# Patient Record
Sex: MALE | Race: WHITE | NOT HISPANIC OR LATINO | Employment: OTHER | URBAN - METROPOLITAN AREA
[De-identification: names, ages, dates, MRNs, and addresses within clinical notes are randomized per-mention and may not be internally consistent; named-entity substitution may affect disease eponyms.]

---

## 2024-07-03 ENCOUNTER — MEDICAL CORRESPONDENCE (OUTPATIENT)
Dept: HEALTH INFORMATION MANAGEMENT | Facility: CLINIC | Age: 69
End: 2024-07-03

## 2024-07-03 ENCOUNTER — TRANSFERRED RECORDS (OUTPATIENT)
Dept: HEALTH INFORMATION MANAGEMENT | Facility: CLINIC | Age: 69
End: 2024-07-03
Payer: COMMERCIAL

## 2024-07-17 ENCOUNTER — TRANSCRIBE ORDERS (OUTPATIENT)
Dept: OTHER | Age: 69
End: 2024-07-17

## 2024-07-17 ENCOUNTER — TELEPHONE (OUTPATIENT)
Dept: OPHTHALMOLOGY | Facility: CLINIC | Age: 69
End: 2024-07-17
Payer: COMMERCIAL

## 2024-07-17 DIAGNOSIS — C44.1122 BASAL CELL CARCINOMA OF RIGHT LOWER EYELID: Primary | ICD-10-CM

## 2024-07-17 NOTE — TELEPHONE ENCOUNTER
[facility] Call Center    Phone Message    May a detailed message be left on voicemail: yes     Reason for Call: Appointment Intake    Referring Provider Name: Leanna Joy MD  Diagnosis and/or Symptoms:Right eye, Basal Cell carcinoma right lower lid and lacrimal sac   Referral is in pts chart, writer unable to schedule per guidelines please contact pt to discuss options Thank you     Action Taken: Message routed to:  [facility] eye    Travel Screening: Not Applicable     Date of Service:

## 2024-07-18 ENCOUNTER — TELEPHONE (OUTPATIENT)
Dept: OPHTHALMOLOGY | Facility: CLINIC | Age: 69
End: 2024-07-18
Payer: COMMERCIAL

## 2024-07-18 NOTE — TELEPHONE ENCOUNTER
"Patient confirmed scheduled appointment:  Date: 7/23/24  Time: 7:45am  Visit type: NEW PLASTICS  Provider:    Location: [facility] Location   Testing/imaging: MOHS EVAL  Additional notes: \"RLL medial canthus BCC, Mohs eval, notes and path in Epic\"-Per TOD PERAZA-Appt Per PT         Scheduled patient as offered. Sent reminder letter and map to confirmed address.-Per Patient    "

## 2024-07-18 NOTE — TELEPHONE ENCOUNTER
Please review for how soon for scheduling.    High suspicion of extensive basal cell carcinoma Rt lower lid and lacrimal sac area.  biopsy taken today, recommend Mohs      REQUESTING PLASTICS     Referring Provider Name: Leanna Joy MD  Diagnosis and/or Symptoms:Right eye, Basal Cell carcinoma right lower lid and lacrimal sac   Referral is in pts chart

## 2024-07-18 NOTE — TELEPHONE ENCOUNTER
High suspicion of extensive basal cell carcinoma Rt lower lid and lacrimal sac area.  biopsy taken today, recommend Mohs     REQUESTING PLASTICS     Thanks,     V

## 2024-07-18 NOTE — TELEPHONE ENCOUNTER
"Ok to schedule w/ Ali preferably [facility] on 7/23, if that doesn't work could do [facility] 7/24 @ 2:30. \"Preceptal cellulitis, records in Care Everywhere\"    Thanks,  Krystina Sharp RN RN 12:52 PM 07/18/24    "

## 2024-07-18 NOTE — TELEPHONE ENCOUNTER
"Gayathri-  Ok to offer whatever appointment time the other patient doesn't take per our discussion.  \"RLL [facility], Mohs octavia, notes and path in Epic\"    Thank you,  Krystina Sharp RN RN 1:02 PM 07/18/24    "

## 2024-07-19 ENCOUNTER — TELEPHONE (OUTPATIENT)
Dept: OPHTHALMOLOGY | Facility: CLINIC | Age: 69
End: 2024-07-19
Payer: COMMERCIAL

## 2024-07-19 NOTE — TELEPHONE ENCOUNTER
FUTURE VISIT INFORMATION      FUTURE VISIT INFORMATION:  Date: 7/23/24  Time: 7:45am  Location[facility]  REFERRAL INFORMATION:  Referring provider:  TOD PERAZA   Referring providers clinic:  [facility]  Reason for visit/diagnosis  RLL medial canthus BCC, Mohs eval    RECORDS REQUESTED FROM:       Clinic name Comments Records Status Imaging Status   [facility] Recs scanned into chart  Path report 7/3/24 EPIC

## 2024-07-23 ENCOUNTER — PRE VISIT (OUTPATIENT)
Dept: OPHTHALMOLOGY | Facility: CLINIC | Age: 69
End: 2024-07-23

## 2024-07-23 ENCOUNTER — OFFICE VISIT (OUTPATIENT)
Dept: OPHTHALMOLOGY | Facility: CLINIC | Age: 69
End: 2024-07-23
Attending: OPHTHALMOLOGY
Payer: COMMERCIAL

## 2024-07-23 DIAGNOSIS — C44.1122 BASAL CELL CARCINOMA OF RIGHT LOWER EYELID: ICD-10-CM

## 2024-07-23 PROCEDURE — 99204 OFFICE O/P NEW MOD 45 MIN: CPT | Mod: GC | Performed by: OPHTHALMOLOGY

## 2024-07-23 PROCEDURE — 92285 EXTERNAL OCULAR PHOTOGRAPHY: CPT | Mod: GC | Performed by: OPHTHALMOLOGY

## 2024-07-23 RX ORDER — ROSUVASTATIN CALCIUM 40 MG/1
40 TABLET, COATED ORAL AT BEDTIME
COMMUNITY

## 2024-07-23 RX ORDER — FERROUS SULFATE 324(65)MG
TABLET, DELAYED RELEASE (ENTERIC COATED) ORAL
COMMUNITY
Start: 2018-02-01

## 2024-07-23 RX ORDER — EZETIMIBE 10 MG/1
1 TABLET ORAL DAILY
COMMUNITY
Start: 2022-02-01

## 2024-07-23 RX ORDER — ASPIRIN 81 MG/1
TABLET, CHEWABLE ORAL
COMMUNITY
Start: 2018-02-01

## 2024-07-23 RX ORDER — ERYTHROMYCIN 5 MG/G
OINTMENT OPHTHALMIC
COMMUNITY
Start: 2024-07-05

## 2024-07-23 RX ORDER — TADALAFIL 20 MG/1
1 TABLET ORAL DAILY
COMMUNITY

## 2024-07-23 RX ORDER — TAMSULOSIN HYDROCHLORIDE 0.4 MG/1
1 CAPSULE ORAL 2 TIMES DAILY
COMMUNITY
Start: 2023-06-01

## 2024-07-23 ASSESSMENT — VISUAL ACUITY
OD_CC: 20/30
OD_PH_CC+: 18
OD_CC+: -1
OD_PH_CC: 19
OS_CC: 20/25
METHOD: SNELLEN - LINEAR
CORRECTION_TYPE: GLASSES

## 2024-07-23 ASSESSMENT — EXTERNAL EXAM - LEFT EYE: OS_EXAM: NORMAL

## 2024-07-23 ASSESSMENT — CONF VISUAL FIELD
OD_NORMAL: 1
OS_SUPERIOR_TEMPORAL_RESTRICTION: 0
OD_SUPERIOR_NASAL_RESTRICTION: 0
OS_NORMAL: 1
OD_INFERIOR_TEMPORAL_RESTRICTION: 0
OS_SUPERIOR_NASAL_RESTRICTION: 0
OS_INFERIOR_NASAL_RESTRICTION: 0
OD_INFERIOR_NASAL_RESTRICTION: 0
METHOD: COUNTING FINGERS
OS_INFERIOR_TEMPORAL_RESTRICTION: 0
OD_SUPERIOR_TEMPORAL_RESTRICTION: 0

## 2024-07-23 ASSESSMENT — SLIT LAMP EXAM - LIDS: COMMENTS: NORMAL

## 2024-07-23 ASSESSMENT — TONOMETRY
OD_IOP_MMHG: 19
OS_IOP_MMHG: 18
IOP_METHOD: ICARE

## 2024-07-23 ASSESSMENT — EXTERNAL EXAM - RIGHT EYE: OD_EXAM: NORMAL

## 2024-07-23 NOTE — LETTER
2024         RE:  :  MRN: Manuel Hurd  1955  [id number]     Dear Dr. Leanna Joy,    Thank you for asking me to see your patient, Manuel Hurd, for an oculoplastic   consultation.  My assessment and plan are below.  For further details, please see my attached clinic note.      CC: Eyelid lesion    Chief Complaint(s) and History of Present Illness(es)     Consult For            Associated symptoms: tearing and discharge    Treatments tried: ointment    Pain scale: 0/10    Comments: Manuel Hurd is being seen for a Mohs Evaluation today by the   request of Dr. Joy due to L medical canthus BCC .            Comments    Patient report that for the past 2 years that there was an area of concern   near the nasal corner of right eye. No issues with discomfort related to   area. Biopsy done on 7/3/24 that identified the BCC.  Watering with right eye> left eye the past 1year. Mostly tearing but some   mucus each eye. Some blurring with each eye but feels likely related to   tearing each eye.     Luann Candelaria, COT COT 7:42 AM 2024               HPI:     Manuel Hurd is a 68 year old male who has noted a lesion on the right lower eyelid. It has been present for 2 years. This has been biopsied (7/3/24).    Enlarging: Yes  Irritating to eyelashes and catches in folds of skin: Yes; also has epiphora right eye > left eye  Bleeding: Yes  Prior cutaneous malignancy: No  Immunosuppression: No    PMH: HTN, HLD, CAD (s/p stents, on ASA 81 mg daily)         Assessment and Plan:   1. Right lower eyelid biopsy proven BCC    CT orbits w/ and w/o contrast to assess lesion extent then likely Mohs referral with oculoplastics reconstruction   Discussed with patient that lesion may involve lacrimal sac and his epiphora may worsen post-operatively. Discussed that there are interventions that can be taken once he heals if epiphora persists and is bothersome.  Recommend also establishing care with  dermatology for full body skin evaluation (has never seen a dermatologist).      Again, thank you for allowing me to participate in the care of your patient.      Sincerely,    Lanre Linn MD  Department of Ophthalmology and Visual Neurosciences  [facility]    CC: Mini Cruz PA-C  [address] [place] [id number]  Via Fax: [phone]     TOD PERAZA  [address] [place] [address] [address] [place] [address]  Via Fax: [phone]

## 2024-07-23 NOTE — PROGRESS NOTES
Oculoplastic Clinic New Patient    Patient: Manuel Hurd MRN# [id number]   YOB: 1955 Age: 68 year old   Date of Visit: Jul 23, 2024         CC: Eyelid lesion    Chief Complaint(s) and History of Present Illness(es)     Consult For            Associated symptoms: tearing and discharge    Treatments tried: ointment    Pain scale: 0/10    Comments: Manuel Hurd is being seen for a Mohs Evaluation today by the   request of Dr. Joy due to RLL medical canthus BCC .            Comments    Patient report that for the past 2 years that there was an area of concern   near the nasal corner of right eye. No issues with discomfort related to   area. Biopsy done on 7/3/24 that identified the BCC.  Watering with right eye> left eye the past 1year. Mostly tearing but some   mucus each eye. Some blurring with each eye but feels likely related to   tearing each eye.     ADRIANA De Jesus COT 7:42 AM July 23, 2024               HPI:     Manuel Hurd is a 68 year old male who has noted a lesion on the right lower eyelid. It has been present for 2 years. This has been biopsied (7/3/24).    Enlarging: Yes  Irritating to eyelashes and catches in folds of skin: Yes; also has epiphora right eye > left eye  Bleeding: Yes  Prior cutaneous malignancy: No  Immunosuppression: No    PMH: HTN, HLD, CAD (s/p stents, on ASA 81 mg daily)         Assessment and Plan:   1. Right lower eyelid biopsy proven BCC    CT orbits w/ and w/o contrast to assess lesion extent then likely Mohs referral with oculoplastics reconstruction   Discussed with patient that lesion may involve lacrimal sac and his epiphora may worsen post-operatively. Discussed that there are interventions that can be taken once he heals if epiphora persists and is bothersome.  Recommend also establishing care with dermatology for full body skin evaluation (has never seen a dermatologist).       Faye Mcgraw MD  Oculoplastic Surgery Fellow     Agree with above.  Firmly fixed to bone along medial canthus. CT orbit. Mohs consult. Will schedule with enough time for possible further excision, possible resection of some bone with drill or sonopet. Discussed he will likely lose much of his lacrimal system and extent of recon will depend on what we have left. Likely glabella flap or small paramedian forehead flap.    Attending Physician Attestation: Complete documentation of historical and exam elements from today's encounter can be found in the full encounter summary report (not reduplicated in this progress note). I personally obtained the chief complaint(s) and history of present illness. I confirmed and edited as necessary the review of systems, past medical/surgical history, family history, social history, and examination findings as documented by others; and I examined the patient myself. I personally reviewed the relevant tests, images, and reports as documented above. I formulated and edited as necessary the assessment and plan and discussed the findings and management plan with the patient. Lanre Linn MD    Today with Manuel Hurd, I reviewed the indications, risks, benefits, and alternatives of the proposed surgical procedure including, but not limited to, failure obtain the desired result  and need for additional surgery, bleeding, infection, loss of vision, injury to the eye, and the remote possibility of permanent damage to any organ system or death with the use of anesthesia.  I provided multiple opportunities for the questions, answered all questions to the best of my ability, and confirmed that my answers and my discussion were understood. Lanre Linn MD

## 2024-07-23 NOTE — NURSING NOTE
Chief Complaints and History of Present Illnesses   Patient presents with    Consult For     Manuel Hurd is being seen for a Mohs Evaluation today by the request of Dr. Joy due to [facility] BCC .       Chief Complaint(s) and History of Present Illness(es)       Consult For              Associated symptoms: tearing and discharge    Treatments tried: ointment    Pain scale: 0/10    Comments: Manuel Hurd is being seen for a Mohs Evaluation today by the request of Dr. Joy due to [facility] BCC .                Comments    Patient report that for the past 2 years that there was an area of concern near the nasal corner of right eye. No issues with discomfort related to area. Biopsy done on 7/3/24 that identified the BCC.  Watering with right eye> left eye the past 1year. Mostly tearing but some mucus each eye. Some blurring with each eye but feels likely related to tearing each eye.     Luann Candelaria, COT COT 7:42 AM July 23, 2024

## 2024-07-28 ENCOUNTER — HEALTH MAINTENANCE LETTER (OUTPATIENT)
Age: 69
End: 2024-07-28

## 2024-08-01 ENCOUNTER — ANCILLARY PROCEDURE (OUTPATIENT)
Dept: CT IMAGING | Facility: CLINIC | Age: 69
End: 2024-08-01
Attending: OPHTHALMOLOGY
Payer: COMMERCIAL

## 2024-08-01 DIAGNOSIS — C44.1122 BASAL CELL CARCINOMA OF RIGHT LOWER EYELID: ICD-10-CM

## 2024-08-01 PROCEDURE — 70481 CT ORBIT/EAR/FOSSA W/DYE: CPT | Mod: GC | Performed by: STUDENT IN AN ORGANIZED HEALTH CARE EDUCATION/TRAINING PROGRAM

## 2024-08-01 RX ORDER — IOPAMIDOL 755 MG/ML
75 INJECTION, SOLUTION INTRAVASCULAR ONCE
Status: COMPLETED | OUTPATIENT
Start: 2024-08-01 | End: 2024-08-01

## 2024-08-01 RX ADMIN — IOPAMIDOL 75 ML: 755 INJECTION, SOLUTION INTRAVASCULAR at 11:40

## 2024-08-05 ENCOUNTER — MYC MEDICAL ADVICE (OUTPATIENT)
Dept: OPHTHALMOLOGY | Facility: CLINIC | Age: 69
End: 2024-08-05
Payer: COMMERCIAL

## 2024-08-09 PROBLEM — C44.1122 BASAL CELL CARCINOMA OF RIGHT LOWER EYELID: Status: ACTIVE | Noted: 2024-07-23

## 2024-08-09 NOTE — TELEPHONE ENCOUNTER
Date Scheduled: 9-26-24  Facility: Surgery Locations: [facility] [place]  Surgeon: Dr. Linn   Post-op appointment scheduled:    scheduled?: No  Surgery packet/instructions confirmed received?  Yes-mailed  Pre op physical/PAC appointment: [facility] in [place]  Special Considerations:     Karen Gutierrez  Surgery Scheduling Coordinator  Ph: [phone]

## 2024-08-09 NOTE — TELEPHONE ENCOUNTER
Message left for the patient to call back to get surgery scheduled with Dr. Linn and Dr. Narayanan.     [facility]  Surgery Scheduling Coordinator  Ph: [phone]

## 2024-09-20 NOTE — TELEPHONE ENCOUNTER
FUTURE VISIT INFORMATION      FUTURE VISIT INFORMATION:  Date: 9.26.24  Time: 8:30  Location: [facility] [place] [place]  REFERRAL INFORMATION:  Referring provider:  Kaveh  Referring providers clinic:  [facility]  Reason for visit/diagnosis  BCC R lower eyelid. closing with oculoplastics.      RECORDS REQUESTED FROM:       Clinic name Comments Records Status Imaging Status   [facility] 7.23.24  [facility] Derm 7.3.24  [facility]  Path # [id number] Received

## 2024-09-25 ENCOUNTER — ANESTHESIA EVENT (OUTPATIENT)
Dept: SURGERY | Facility: AMBULATORY SURGERY CENTER | Age: 69
End: 2024-09-25
Payer: COMMERCIAL

## 2024-09-26 ENCOUNTER — PRE VISIT (OUTPATIENT)
Dept: DERMATOLOGY | Facility: CLINIC | Age: 69
End: 2024-09-26

## 2024-09-26 ENCOUNTER — ANESTHESIA (OUTPATIENT)
Dept: SURGERY | Facility: AMBULATORY SURGERY CENTER | Age: 69
End: 2024-09-26
Payer: COMMERCIAL

## 2024-09-26 ENCOUNTER — HOSPITAL ENCOUNTER (OUTPATIENT)
Facility: AMBULATORY SURGERY CENTER | Age: 69
Discharge: HOME | End: 2024-09-26
Attending: OPHTHALMOLOGY
Payer: COMMERCIAL

## 2024-09-26 ENCOUNTER — OFFICE VISIT (OUTPATIENT)
Dept: DERMATOLOGY | Facility: CLINIC | Age: 69
End: 2024-09-26
Payer: COMMERCIAL

## 2024-09-26 VITALS
RESPIRATION RATE: 16 BRPM | HEIGHT: 69 IN | WEIGHT: 200 LBS | HEART RATE: 68 BPM | OXYGEN SATURATION: 94 % | DIASTOLIC BLOOD PRESSURE: 75 MMHG | BODY MASS INDEX: 29.62 KG/M2 | SYSTOLIC BLOOD PRESSURE: 123 MMHG | TEMPERATURE: 97.6 F

## 2024-09-26 VITALS — HEART RATE: 67 BPM | DIASTOLIC BLOOD PRESSURE: 79 MMHG | SYSTOLIC BLOOD PRESSURE: 134 MMHG

## 2024-09-26 DIAGNOSIS — C44.111 BASAL CELL CARCINOMA (BCC) OF MEDIAL CANTHUS OF RIGHT EYE: ICD-10-CM

## 2024-09-26 DIAGNOSIS — C44.111 BASAL CELL CARCINOMA (BCC) OF MEDIAL CANTHUS OF RIGHT EYE: Primary | ICD-10-CM

## 2024-09-26 DIAGNOSIS — C44.1122 BASAL CELL CARCINOMA OF RIGHT LOWER EYELID: Primary | ICD-10-CM

## 2024-09-26 PROCEDURE — 67450 EXPLORE/BIOPSY EYE SOCKET: CPT | Mod: RT | Performed by: OPHTHALMOLOGY

## 2024-09-26 PROCEDURE — 21280 MEDIAL CANTHOPEXY: CPT | Mod: 59 | Performed by: OPHTHALMOLOGY

## 2024-09-26 PROCEDURE — 17312 MOHS ADDL STAGE: CPT | Performed by: DERMATOLOGY

## 2024-09-26 PROCEDURE — 14301 TIS TRNFR ANY 30.1-60 SQ CM: CPT | Mod: GC | Performed by: OPHTHALMOLOGY

## 2024-09-26 PROCEDURE — 68520 REMOVAL OF TEAR SAC: CPT | Mod: RT | Performed by: OPHTHALMOLOGY

## 2024-09-26 PROCEDURE — 88305 TISSUE EXAM BY PATHOLOGIST: CPT | Mod: 26 | Performed by: OPHTHALMOLOGY

## 2024-09-26 PROCEDURE — 67450 EXPLORE/BIOPSY EYE SOCKET: CPT | Performed by: NURSE ANESTHETIST, CERTIFIED REGISTERED

## 2024-09-26 PROCEDURE — 17311 MOHS 1 STAGE H/N/HF/G: CPT | Performed by: DERMATOLOGY

## 2024-09-26 PROCEDURE — 88305 TISSUE EXAM BY PATHOLOGIST: CPT | Mod: TC | Performed by: OPHTHALMOLOGY

## 2024-09-26 PROCEDURE — 67450 EXPLORE/BIOPSY EYE SOCKET: CPT | Performed by: ANESTHESIOLOGY

## 2024-09-26 PROCEDURE — 14302 TIS TRNFR ADDL 30 SQ CM: CPT | Mod: GC | Performed by: OPHTHALMOLOGY

## 2024-09-26 RX ORDER — DEXAMETHASONE SODIUM PHOSPHATE 10 MG/ML
4 INJECTION, SOLUTION INTRAMUSCULAR; INTRAVENOUS
Status: DISCONTINUED | OUTPATIENT
Start: 2024-09-26 | End: 2024-09-27 | Stop reason: HOSPADM

## 2024-09-26 RX ORDER — DEXAMETHASONE SODIUM PHOSPHATE 10 MG/ML
4 INJECTION, SOLUTION INTRAMUSCULAR; INTRAVENOUS
Status: DISCONTINUED | OUTPATIENT
Start: 2024-09-26 | End: 2024-09-26 | Stop reason: HOSPADM

## 2024-09-26 RX ORDER — GINSENG 100 MG
CAPSULE ORAL PRN
Status: DISCONTINUED | OUTPATIENT
Start: 2024-09-26 | End: 2024-09-26 | Stop reason: HOSPADM

## 2024-09-26 RX ORDER — PROPOFOL 10 MG/ML
INJECTION, EMULSION INTRAVENOUS PRN
Status: DISCONTINUED | OUTPATIENT
Start: 2024-09-26 | End: 2024-09-26

## 2024-09-26 RX ORDER — FENTANYL CITRATE 50 UG/ML
25 INJECTION, SOLUTION INTRAMUSCULAR; INTRAVENOUS EVERY 5 MIN PRN
Status: DISCONTINUED | OUTPATIENT
Start: 2024-09-26 | End: 2024-09-26 | Stop reason: HOSPADM

## 2024-09-26 RX ORDER — ONDANSETRON 4 MG/1
4 TABLET, ORALLY DISINTEGRATING ORAL EVERY 30 MIN PRN
Status: DISCONTINUED | OUTPATIENT
Start: 2024-09-26 | End: 2024-09-26 | Stop reason: HOSPADM

## 2024-09-26 RX ORDER — NALOXONE HYDROCHLORIDE 0.4 MG/ML
0.1 INJECTION, SOLUTION INTRAMUSCULAR; INTRAVENOUS; SUBCUTANEOUS
Status: DISCONTINUED | OUTPATIENT
Start: 2024-09-26 | End: 2024-09-26 | Stop reason: HOSPADM

## 2024-09-26 RX ORDER — FENTANYL CITRATE 50 UG/ML
50 INJECTION, SOLUTION INTRAMUSCULAR; INTRAVENOUS EVERY 5 MIN PRN
Status: DISCONTINUED | OUTPATIENT
Start: 2024-09-26 | End: 2024-09-26 | Stop reason: HOSPADM

## 2024-09-26 RX ORDER — OXYCODONE HYDROCHLORIDE 5 MG/1
5 TABLET ORAL EVERY 6 HOURS PRN
Qty: 25 TABLET | Refills: 0 | Status: SHIPPED | OUTPATIENT
Start: 2024-09-26 | End: 2024-10-02

## 2024-09-26 RX ORDER — ONDANSETRON 2 MG/ML
4 INJECTION INTRAMUSCULAR; INTRAVENOUS EVERY 30 MIN PRN
Status: DISCONTINUED | OUTPATIENT
Start: 2024-09-26 | End: 2024-09-26 | Stop reason: HOSPADM

## 2024-09-26 RX ORDER — COCAINE HYDROCHLORIDE 40 MG/ML
SOLUTION NASAL PRN
Status: DISCONTINUED | OUTPATIENT
Start: 2024-09-26 | End: 2024-09-26 | Stop reason: HOSPADM

## 2024-09-26 RX ORDER — ONDANSETRON 2 MG/ML
4 INJECTION INTRAMUSCULAR; INTRAVENOUS EVERY 30 MIN PRN
Status: DISCONTINUED | OUTPATIENT
Start: 2024-09-26 | End: 2024-09-27 | Stop reason: HOSPADM

## 2024-09-26 RX ORDER — BACITRACIN ZINC 500 [USP'U]/G
OINTMENT TOPICAL
Qty: 113 G | Refills: 2 | Status: SHIPPED | OUTPATIENT
Start: 2024-09-26

## 2024-09-26 RX ORDER — SODIUM CHLORIDE, SODIUM LACTATE, POTASSIUM CHLORIDE, CALCIUM CHLORIDE 600; 310; 30; 20 MG/100ML; MG/100ML; MG/100ML; MG/100ML
INJECTION, SOLUTION INTRAVENOUS CONTINUOUS
Status: DISCONTINUED | OUTPATIENT
Start: 2024-09-26 | End: 2024-09-26 | Stop reason: HOSPADM

## 2024-09-26 RX ORDER — ACETAMINOPHEN 325 MG/1
975 TABLET ORAL ONCE
Status: COMPLETED | OUTPATIENT
Start: 2024-09-26 | End: 2024-09-26

## 2024-09-26 RX ORDER — ONDANSETRON 4 MG/1
4 TABLET, ORALLY DISINTEGRATING ORAL EVERY 30 MIN PRN
Status: DISCONTINUED | OUTPATIENT
Start: 2024-09-26 | End: 2024-09-27 | Stop reason: HOSPADM

## 2024-09-26 RX ORDER — CEFAZOLIN SODIUM 1 G/3ML
INJECTION, POWDER, FOR SOLUTION INTRAMUSCULAR; INTRAVENOUS PRN
Status: DISCONTINUED | OUTPATIENT
Start: 2024-09-26 | End: 2024-09-26

## 2024-09-26 RX ORDER — OXYMETAZOLINE HYDROCHLORIDE 0.05 G/100ML
SPRAY NASAL PRN
Status: DISCONTINUED | OUTPATIENT
Start: 2024-09-26 | End: 2024-09-26 | Stop reason: HOSPADM

## 2024-09-26 RX ORDER — FENTANYL CITRATE 50 UG/ML
50 INJECTION, SOLUTION INTRAMUSCULAR; INTRAVENOUS ONCE
Status: COMPLETED | OUTPATIENT
Start: 2024-09-26 | End: 2024-09-26

## 2024-09-26 RX ORDER — ONDANSETRON 2 MG/ML
INJECTION INTRAMUSCULAR; INTRAVENOUS PRN
Status: DISCONTINUED | OUTPATIENT
Start: 2024-09-26 | End: 2024-09-26

## 2024-09-26 RX ORDER — LIDOCAINE 40 MG/G
CREAM TOPICAL
Status: DISCONTINUED | OUTPATIENT
Start: 2024-09-26 | End: 2024-09-26 | Stop reason: HOSPADM

## 2024-09-26 RX ORDER — HYDROMORPHONE HYDROCHLORIDE 1 MG/ML
0.4 INJECTION, SOLUTION INTRAMUSCULAR; INTRAVENOUS; SUBCUTANEOUS EVERY 5 MIN PRN
Status: DISCONTINUED | OUTPATIENT
Start: 2024-09-26 | End: 2024-09-26 | Stop reason: HOSPADM

## 2024-09-26 RX ORDER — ERYTHROMYCIN 5 MG/G
OINTMENT OPHTHALMIC
Qty: 3.5 G | Refills: 1 | Status: SHIPPED | OUTPATIENT
Start: 2024-09-26

## 2024-09-26 RX ORDER — LABETALOL HYDROCHLORIDE 5 MG/ML
10 INJECTION, SOLUTION INTRAVENOUS
Status: DISCONTINUED | OUTPATIENT
Start: 2024-09-26 | End: 2024-09-26 | Stop reason: HOSPADM

## 2024-09-26 RX ORDER — OXYMETAZOLINE HYDROCHLORIDE 0.05 G/100ML
1 SPRAY NASAL 2 TIMES DAILY
Qty: 1 ML | Refills: 0 | Status: SHIPPED | OUTPATIENT
Start: 2024-09-26 | End: 2024-09-29

## 2024-09-26 RX ORDER — HYDROMORPHONE HYDROCHLORIDE 1 MG/ML
0.2 INJECTION, SOLUTION INTRAMUSCULAR; INTRAVENOUS; SUBCUTANEOUS EVERY 5 MIN PRN
Status: DISCONTINUED | OUTPATIENT
Start: 2024-09-26 | End: 2024-09-26 | Stop reason: HOSPADM

## 2024-09-26 RX ORDER — ERYTHROMYCIN 5 MG/G
OINTMENT OPHTHALMIC ONCE
Status: SHIPPED | OUTPATIENT
Start: 2024-09-26 | End: 2024-09-26

## 2024-09-26 RX ORDER — ERYTHROMYCIN 5 MG/G
OINTMENT OPHTHALMIC ONCE
Status: COMPLETED | OUTPATIENT
Start: 2024-09-26 | End: 2024-09-26

## 2024-09-26 RX ORDER — OXYCODONE HYDROCHLORIDE 5 MG/1
10 TABLET ORAL
Status: DISCONTINUED | OUTPATIENT
Start: 2024-09-26 | End: 2024-09-27 | Stop reason: HOSPADM

## 2024-09-26 RX ORDER — OXYCODONE HYDROCHLORIDE 5 MG/1
5 TABLET ORAL
Status: COMPLETED | OUTPATIENT
Start: 2024-09-26 | End: 2024-09-26

## 2024-09-26 RX ORDER — DEXAMETHASONE SODIUM PHOSPHATE 4 MG/ML
INJECTION, SOLUTION INTRA-ARTICULAR; INTRALESIONAL; INTRAMUSCULAR; INTRAVENOUS; SOFT TISSUE PRN
Status: DISCONTINUED | OUTPATIENT
Start: 2024-09-26 | End: 2024-09-26

## 2024-09-26 RX ORDER — PROPARACAINE HYDROCHLORIDE 5 MG/ML
1 SOLUTION/ DROPS OPHTHALMIC ONCE
Status: COMPLETED | OUTPATIENT
Start: 2024-09-26 | End: 2024-09-26

## 2024-09-26 RX ORDER — FENTANYL CITRATE 50 UG/ML
INJECTION, SOLUTION INTRAMUSCULAR; INTRAVENOUS PRN
Status: DISCONTINUED | OUTPATIENT
Start: 2024-09-26 | End: 2024-09-26

## 2024-09-26 RX ORDER — NALOXONE HYDROCHLORIDE 0.4 MG/ML
0.1 INJECTION, SOLUTION INTRAMUSCULAR; INTRAVENOUS; SUBCUTANEOUS
Status: DISCONTINUED | OUTPATIENT
Start: 2024-09-26 | End: 2024-09-27 | Stop reason: HOSPADM

## 2024-09-26 RX ORDER — PROPOFOL 10 MG/ML
INJECTION, EMULSION INTRAVENOUS CONTINUOUS PRN
Status: DISCONTINUED | OUTPATIENT
Start: 2024-09-26 | End: 2024-09-26

## 2024-09-26 RX ADMIN — OXYCODONE HYDROCHLORIDE 5 MG: 5 TABLET ORAL at 17:11

## 2024-09-26 RX ADMIN — Medication 100 MCG: at 13:40

## 2024-09-26 RX ADMIN — FENTANYL CITRATE 50 MCG: 50 INJECTION, SOLUTION INTRAMUSCULAR; INTRAVENOUS at 13:18

## 2024-09-26 RX ADMIN — PROPOFOL 150 MCG/KG/MIN: 10 INJECTION, EMULSION INTRAVENOUS at 13:59

## 2024-09-26 RX ADMIN — CEFAZOLIN SODIUM 2 G: 1 INJECTION, POWDER, FOR SOLUTION INTRAMUSCULAR; INTRAVENOUS at 13:10

## 2024-09-26 RX ADMIN — PROPOFOL 200 MG: 10 INJECTION, EMULSION INTRAVENOUS at 13:18

## 2024-09-26 RX ADMIN — PROPOFOL 120 MCG/KG/MIN: 10 INJECTION, EMULSION INTRAVENOUS at 15:38

## 2024-09-26 RX ADMIN — Medication 200 MCG: at 13:35

## 2024-09-26 RX ADMIN — PROPOFOL 50 MG: 10 INJECTION, EMULSION INTRAVENOUS at 14:43

## 2024-09-26 RX ADMIN — FENTANYL CITRATE 25 MCG: 50 INJECTION, SOLUTION INTRAMUSCULAR; INTRAVENOUS at 16:46

## 2024-09-26 RX ADMIN — FENTANYL CITRATE 50 MCG: 50 INJECTION, SOLUTION INTRAMUSCULAR; INTRAVENOUS at 17:03

## 2024-09-26 RX ADMIN — PROPOFOL 150 MCG/KG/MIN: 10 INJECTION, EMULSION INTRAVENOUS at 15:09

## 2024-09-26 RX ADMIN — PROPOFOL 150 MCG/KG/MIN: 10 INJECTION, EMULSION INTRAVENOUS at 13:23

## 2024-09-26 RX ADMIN — PROPARACAINE HYDROCHLORIDE 1 DROP: 5 SOLUTION/ DROPS OPHTHALMIC at 12:34

## 2024-09-26 RX ADMIN — ONDANSETRON 4 MG: 2 INJECTION INTRAMUSCULAR; INTRAVENOUS at 13:10

## 2024-09-26 RX ADMIN — ACETAMINOPHEN 975 MG: 325 TABLET ORAL at 13:03

## 2024-09-26 RX ADMIN — SODIUM CHLORIDE, SODIUM LACTATE, POTASSIUM CHLORIDE, CALCIUM CHLORIDE: 600; 310; 30; 20 INJECTION, SOLUTION INTRAVENOUS at 13:03

## 2024-09-26 RX ADMIN — FENTANYL CITRATE 50 MCG: 50 INJECTION, SOLUTION INTRAMUSCULAR; INTRAVENOUS at 13:22

## 2024-09-26 RX ADMIN — DEXAMETHASONE SODIUM PHOSPHATE 4 MG: 4 INJECTION, SOLUTION INTRA-ARTICULAR; INTRALESIONAL; INTRAMUSCULAR; INTRAVENOUS; SOFT TISSUE at 13:25

## 2024-09-26 RX ADMIN — PROPOFOL 50 MG: 10 INJECTION, EMULSION INTRAVENOUS at 14:50

## 2024-09-26 RX ADMIN — ERYTHROMYCIN: 5 OINTMENT OPHTHALMIC at 12:33

## 2024-09-26 RX ADMIN — Medication 200 MCG: at 13:30

## 2024-09-26 RX ADMIN — Medication 100 MCG: at 13:54

## 2024-09-26 RX ADMIN — PROPOFOL 150 MCG/KG/MIN: 10 INJECTION, EMULSION INTRAVENOUS at 14:40

## 2024-09-26 RX ADMIN — Medication 50 MG: at 13:19

## 2024-09-26 ASSESSMENT — PAIN SCALES - GENERAL: PAINLEVEL: NO PAIN (0)

## 2024-09-26 NOTE — ANESTHESIA PROCEDURE NOTES
Airway       Patient location during procedure: OR       Procedure Start/Stop Times: 9/26/2024 1:23 PM  Staff -        CRNA: Jyoti Lockhart APRN CRNA       Performed By: CRNAIndications and Patient Condition       Indications for airway management: christie-procedural and airway protection       Induction type:intravenous       Mask difficulty assessment: 1 - vent by mask    Final Airway Details       Final airway type: endotracheal airway       Successful airway: ETT - single and Oral  Endotracheal Airway Details        ETT size (mm): 8.0       Cuffed: yes       Successful intubation technique: direct laryngoscopy       DL Blade Type: Leonard 2       Grade View of Cords: 2       Adjucts: stylet       Position: Left       Measured from: lips       Secured at (cm): 23       Bite block used: Oral Airway    Post intubation assessment        Placement verified by: capnometry, equal breath sounds and chest rise        Number of attempts at approach: 1       Number of other approaches attempted: 0       Secured with: tape       Ease of procedure: easy       Dentition: Intact and Unchanged    Medication(s) Administered   Medication Administration Time: 9/26/2024 1:23 PM

## 2024-09-26 NOTE — NURSING NOTE
Chief Complaint   Patient presents with    Procedure     Mohs right lower eyelid     Reyna ARANDA CMA

## 2024-09-26 NOTE — LETTER
9/26/2024       RE: Manuel Hurd  [address]   [address]  [facility] [address]     Dear Colleague,    Thank you for referring your patient, Manuel Hurd, to the [facility] [place] at [facility]. Please see a copy of my visit note below.    [facility] [place] Procedure Note    Date of Service:  Sep 26, 2024  Surgery: Mohs micrographic surgery    Case 1  Repair Type: Oculoplastic surgery - Mokhtarzadeh  Repair Size: n/a cm  Suture Material: n/a  Tumor Type: BCC  Location: R lower eyelid  Derm-Path Accession #: outside path  PreOp Size: 1.4x1.2 cm  PostOp Size: 4x2.5 cm  Mohs Accession #:   Level of Defect: bone (+ at nasal bone)      Procedure:  We discussed the principles of treatment and most likely complications including scarring, bleeding, infection, swelling, pain, crusting, nerve damage, large wound,  incomplete excision, wound dehiscence,  nerve damage, recurrence, and a second procedure may be recommended to obtain the best cosmetic or functional result.    Informed consent was obtained and the patient underwent the procedure as follows:  The patient was placed supine on the operating table.  The cancer was identified, outlined with a marker, and verified by the patient.  The entire surgical field was prepped with iodine.  The surgical site was anesthetized using chlorhexadine.      The area of clinically apparent tumor was debulked. The layer of tissue was then surgically excised using a #15 blade and was then transferred onto a specimen sheet maintaining the orientation of the specimen. Hemostasis was obtained using electrofulguration. The wound site was then covered with a dressing while the tissue samples were processed for examination.    The excised tissue was transported to the Mohs histology laboratory maintaining the tissue orientation.  The tissue specimen was relaxed  so that the entire surgical margin was in a a single horizontal plane for sectioning and inked for precise mapping.  A precise reference map was drawn to reflect the sectioning of the specimen, colored inking of the margins, and orientation on the patient.  The tissue was processed using horizontal sectioning of the base and continuous peripheral margins.  The histopathologic sections were reviewed in conjunction with the reference map.    Total blocks: 1    Total slides:  3    Residual tumor was identified and indicated in red on the reference map, identifying the location where further tissue excision was necessary. Therefore, an additional stage of Mohs Micrographic surgery was deemed necessary.     Stage II   The patient was returned to the operating room, and the area prepped in the usual manner. The residual tumor was excised using the reference map as a guide. The specimen was transfered to a labeled specimen sheet maintaining the orientation of the specimen. Hemostasis was obtained and the wound site was covered with a dressing while the tissue was processed for examination.     The excised tissue was transported to the Mohs histology laboratory maintaining orientation. The specimen margins were inked for precise mapping and a reference map was prepared for the is additional stage to maintain precise orientation as described above. The tissue was processed using horizontal sectioning of the base and continuous peripheral margins. The histopathologic sections were reviewed in conjunction with the reference map.     Total blocks: 2    Total slides:  6    Residual tumor was identified and indicated in red on the reference map, identifying the location where further tissue excision was necessary. Therefore, an additional stage of Mohs Micrographic surgery was deemed necessary.     Stage III   The patient was returned to the operating room, and the area prepped in the usual manner. The residual tumor was excised  using the reference map as a guide. The specimen was transfered to a labeled specimen sheet maintaining the orientation of the specimen. Hemostasis was obtained and the wound site was covered with a dressing while the tissue was processed for examination.     The excised tissue was transported to the Mohs histology laboratory maintaining orientation. The specimen margins were inked for precise mapping and a reference map was prepared for the is additional stage to maintain precise orientation as described above. The tissue was processed using horizontal sectioning of the base and continuous peripheral margins. The histopathologic sections were reviewed in conjunction with the reference map.     Total blocks: 1    Total slides:  3    There were no cancer cells visualized on examination of the soft tissues, therefore Mohs surgery was complete. The area of central bony positivity was marked.  I spoke with OPS and confirmed the plan for bony removal and reconstruction.    The patient was discharged to the 5th floor ASC in good condition.    Attending attestation:  I personally performed the entire procedure.  I have reviewed the note and edited it as necessary, and agree with its contents.    Marino Narayanan M.D.  Professor  Director of Dermatologic Surgery  Department of Dermatology  [facility]    Dermatology Surgery Clinic  [facility] [place] [id number]      Again, thank you for allowing me to participate in the care of your patient.      Sincerely,    Marino Narayanan MD

## 2024-09-26 NOTE — ANESTHESIA CARE TRANSFER NOTE
Patient: Manuel Hurd    Procedure: Procedure(s):  Right medial canthus Mohs closure.  right orbitotomy and resection of mass and bone removal.  Sonopet       Diagnosis: Basal cell carcinoma of right lower eyelid [C44.1122]  Diagnosis Additional Information: No value filed.    Anesthesia Type:   General     Note:    Oropharynx: oropharynx clear of all foreign objects and spontaneously breathing  Level of Consciousness: drowsy  Oxygen Supplementation: blow-by O2  Level of Supplemental Oxygen (L/min / FiO2): 6  Independent Airway: airway patency satisfactory and stable  Dentition: dentition unchanged  Vital Signs Stable: post-procedure vital signs reviewed and stable  Report to RN Given: handoff report given  Patient transferred to: PACU  Comments: Resps easy and reg. Report to PACU RN   Handoff Report: Identifed the Patient, Identified the Reponsible Provider, Reviewed the pertinent medical history, Discussed the surgical course, Reviewed Intra-OP anesthesia mangement and issues during anesthesia, Set expectations for post-procedure period and Allowed opportunity for questions and acknowledgement of understanding      Vitals:  Vitals Value Taken Time   /68 09/26/24 1638   Temp 97.8    Pulse 65 09/26/24 1638   Resp 14    SpO2 98 % 09/26/24 1641   Vitals shown include unfiled device data.    Electronically Signed By: ALLAN Ambriz CRNA  September 26, 2024  4:42 PM

## 2024-09-26 NOTE — ANESTHESIA PREPROCEDURE EVALUATION
"Anesthesia Pre-Procedure Evaluation    Patient: Manuel Hurd   MRN: [id number] : 1955        Procedure : Procedure(s):  Right medial canthus Mohs closure.  Possible right orbitotomy and resection of mass, possible bone removal.  Sonopet  Possible          Past Medical History:   Diagnosis Date    H/O heart artery stent     6 years ago    Hypertension       History reviewed. No pertinent surgical history.   No Known Allergies   Social History     Tobacco Use    Smoking status: Never    Smokeless tobacco: Never   Substance Use Topics    Alcohol use: Not on file      Wt Readings from Last 1 Encounters:   24 90.7 kg (200 lb)        Anesthesia Evaluation            ROS/MED HX  ENT/Pulmonary:       Neurologic:       Cardiovascular:     (+) Dyslipidemia hypertension- -  CAD -  - -                                      METS/Exercise Tolerance:     Hematologic:       Musculoskeletal:       GI/Hepatic:       Renal/Genitourinary:       Endo:       Psychiatric/Substance Use:       Infectious Disease:       Malignancy:       Other:            Physical Exam    Airway  airway exam normal      Mallampati: II   TM distance: > 3 FB   Neck ROM: full   Mouth opening: > 3 cm    Respiratory Devices and Support         Dental       (+) Completely normal teeth      Cardiovascular          Rhythm and rate: regular and normal     Pulmonary   pulmonary exam normal        breath sounds clear to auscultation           OUTSIDE LABS:  CBC: No results found for: \"WBC\", \"HGB\", \"HCT\", \"PLT\"  BMP: No results found for: \"NA\", \"POTASSIUM\", \"CHLORIDE\", \"CO2\", \"BUN\", \"CR\", \"GLC\"  COAGS: No results found for: \"PTT\", \"INR\", \"FIBR\"  POC: No results found for: \"BGM\", \"HCG\", \"HCGS\"  HEPATIC: No results found for: \"ALBUMIN\", \"PROTTOTAL\", \"ALT\", \"AST\", \"GGT\", \"ALKPHOS\", \"BILITOTAL\", \"BILIDIRECT\", \"JANINE\"  OTHER: No results found for: \"PH\", \"LACT\", \"A1C\", \"ALEX\", \"PHOS\", \"MAG\", \"LIPASE\", \"AMYLASE\", \"TSH\", \"T4\", \"T3\", \"CRP\", \"SED\"    Anesthesia " "Plan    ASA Status:  2    NPO Status:  NPO Appropriate    Anesthesia Type: General.     - Airway: ETT   Induction: Intravenous.   Maintenance: Balanced.        Consents    Anesthesia Plan(s) and associated risks, benefits, and realistic alternatives discussed. Questions answered and patient/representative(s) expressed understanding.     - Discussed:     - Discussed with:  Patient      - Extended Intubation/Ventilatory Support Discussed: No.      - Patient is DNR/DNI Status: No     Use of blood products discussed: No .     Postoperative Care    Pain management: IV analgesics, Oral pain medications, Multi-modal analgesia.   PONV prophylaxis: Ondansetron (or other 5HT-3), Background Propofol Infusion     Comments:               Dallin Ramires MD    I have reviewed the pertinent notes and labs in the chart from the past 30 days and (re)examined the patient.  Any updates or changes from those notes are reflected in this note.             # Drug Induced Platelet Defect: home medication list includes an antiplatelet medication  # Overweight: Estimated body mass index is 29.53 kg/m  as calculated from the following:    Height as of this encounter: 1.753 m (5' 9\").    Weight as of this encounter: 90.7 kg (200 lb).      "

## 2024-09-26 NOTE — DISCHARGE INSTRUCTIONS
[facility]  Home Care Following Anesthesia  For 24 hours after surgery:  Get plenty of rest.  A responsible adult must stay with you for at least 24 hours after you leave the surgery center.  Do not drive or use heavy equipment.  If you have weakness or tingling, don't drive or use heavy equipment until this feeling goes away.   Do not drink alcohol.   Avoid strenuous or risky activities.  Ask for help when climbing stairs.  You may feel lightheaded.  IF so, sit for a few minutes before standing.  Have someone help you get up.   If you have nausea (feel sick to your stomach): Drink only clear liquids such as apple juice, ginger ale, broth or 7-Up.  Rest may also help.  Be sure to drink enough fluids.  Move to a regular diet as you feel able.   You may have a slight fever.  Call the doctor if your fever is over 100 F (37.7 C) (taken under the tongue) or lasts longer than 24 hours.  You may have a dry mouth, a sore throat, muscle aches or trouble sleeping. These should go away after 24 hours.  Do not make important or legal decisions.   It is recommended to avoid smoking.               Tips for taking pain medications  To get the best pain relief possible, remember these points:  Take pain medications as directed, before pain becomes severe.  Pain medication can upset your stomach: taking it with food may help.  Constipation is a common side effect of pain medication. Drink plenty of  fluids.  Eat foods high in fiber. Take a stool softener if recommended by your doctor or pharmacist.  Do not drink alcohol, drive or operate machinery while taking pain medications.  Ask about other ways to control pain, such as with heat, ice or relaxation.    Tylenol/Acetaminophen Consumption    If you feel your pain relief is insufficient, you may take Tylenol/Acetaminophen in addition to your narcotic pain medication.   Be careful not to exceed 4,000 mg of Tylenol/Acetaminophen in a 24 hour  period from all sources.  If you are taking extra strength Tylenol/acetaminophen (500 mg), the maximum dose is 8 tablets in 24 hours.  If you are taking regular strength acetaminophen (325 mg), the maximum dose is 12 tablets in 24 hours.    Call a doctor for any of the following:  Signs of infection (fever, growing tenderness at the surgery site, a large amount of drainage or bleeding, severe pain, foul-smelling drainage, redness, swelling).  It has been over 8 to 10 hours since surgery and you are still not able to urinate (pass water).  Headache for over 24 hours.  Numbness, tingling or weakness the day after surgery (if you had spinal anesthesia).  Signs of Covid-19 infection (temperature over 100 degrees, shortness of breath, cough, loss of taste/smell, generalized body aches, persistent headache, chills, sore throat, nausea/vomiting/diarrhea)  Your doctor is:       Dr. Lanre Linn, Ophthalmology: [phone]               Or dial [phone] and ask for the resident on call for:  Ophthalmology  For emergency care, call the:  [facility]:  [phone] (TTY for hearing impaired: [phone])                     Post-operative Instructions  Ophthalmic Plastic and Reconstructive Surgery    Lanre Linn M.D.     All instructions apply to the operated eye(s) or eyelid(s).    Wound care and personal care  ? You may shower or wash your hair the day after surgery. Do not go swimming for at least 2 weeks to prevent contamination of your wounds.  ? You may go for walks and light activity is ok, but no heavy (over 15 pounds) lifting, bending or excessive straining for one week.   ? Do not apply make-up to the eyes or eyelids for 2 weeks after surgery.  ? Expect significant swelling, bruising, black eye and face. Also expect serum caking, crusting and discharge from the eye and/or incisions. A small amount of surface bleeding, and depending on the type of surgery, bleeding from the inside of  the eyelid, is normal for the first 48 hours.  ? Avoid straining, bending at the waist, or lifting more than 15 pounds for 1 week. Sleeping with your head elevated, such as in a recliner, for the first several days can help swelling resolve more quickly.   ? Do continue to ambulate (walk) as you normally would - being sedentary after surgery can cause blood clots.   ? Your eye(s) and eyelid(s) may be painful and tender. This is normal after surgery.      Contact information and follow-up  ? Return to the Eye Clinic for a follow-up appointment with your physician as scheduled. If no appointment has been scheduled:   - [facility] eye clinic: [phone] for an appointment with Dr. Linn within 2 to 3 weeks from your date of surgery.    After hours or on weekends and holidays, call [phone] and ask to speak with the ophthalmologist on call.    An on call person can be reached after hours for concerns. The on call doctor should not call in medication refill requests after hours or on weekends, so please plan accordingly. An effort has been made to provide adequate pain medications following every surgery, and refills will not be provided in most instances.     Medications  ? Restart all regular home medications and eye drops. If you take Plavix or Aspirin on a regular basis, wait for 72 hours after your surgery before restarting these in order to decrease the risk of bleeding complications.  ? Avoid aspirin and aspirin-like medications (Motrin, Aleve, Ibuprofen, Josephine-Austin etc) for 72 hours to reduce the risk of bleeding. You may take Tylenol (acetaminophen) for pain.  ? In addition to your home medications, take the following post-operative medications as prescribed by your physician.    ? Apply antibiotic ointment to all sutures three times a day, and into the operated eye(s) at night.   Use bacitracin ointment on sutures of the face and forehead  Use erythromycin ointment for sutures around  the eye and eyelid    ? If you have ocular irritation, you can use over the counter artificial tears such as Refresh, Systane, or Blink. Do not use Visine, Clear Eyes, or any other drop that gets the red out.   ? Take antibiotic capsules or tablets as directed.  ? In many cases, postoperative discomfort can be managed with Tylenol alone. If narcotic pain medication was prescribed, take pain pills at prescribed frequency as needed for pain.    WARNING:   ? Prescribed narcotic medications may make you drowsy. You must not drive a car, operate heavy machinery or drink alcohol while taking them.  ? Prescribed narcotic pain medications may cause constipation and nausea.

## 2024-09-26 NOTE — BRIEF OP NOTE
[facility] Brief Operative Note    Pre-operative diagnosis: Basal cell carcinoma of right lower eyelid [C44.1122]   Post-operative diagnosis Same   Procedure: Procedure(s):  Right medial canthus Mohs closure.  right orbitotomy and resection of mass and bone removal.  Sonopet   Surgeon(s): Surgeons and Role:     * Lanre Linn MD - Primary     * Arelis Esparza MD - Assisting     * Elijah Gudino MD - Resident - Assisting   Estimated blood loss: 35 mL    Specimens: ID Type Source Tests Collected by Time Destination   1 : Right Medial Canthus Periosteum Tissue Eye, Right SURGICAL PATHOLOGY EXAM Lanre Linn MD 9/26/2024  1:44 PM    2 : Right Lacrimal Sac Tissue Eye, Right SURGICAL PATHOLOGY EXAM Lanre Linn MD 9/26/2024  1:49 PM       Findings: As expected

## 2024-09-26 NOTE — NURSING NOTE
Drug Administration Record    Prior to injection, verified patient identity using patient's name and date of birth.  Due to injection administration, patient instructed to remain in clinic for 15 minutes  afterwards, and to report any adverse reaction to me immediately.    Drug Name: Bupivicaine 0.5%; 5mg per mL  Dose: 6mL (30mg)  Route administered: SQ  NDC #: 3121583457  Amount of waste(mL):4mL  Reason for waste: Single use vial    LOT #: [id number]  SITE: R lower eyelid  : [employer]  EXPIRATION DATE: 02/2028    Lynda ESPOSITO RN  Dermatology Surgery  [phone]

## 2024-09-26 NOTE — NURSING NOTE
Drug Administration Record     verified patient identity using patient's name and date of birth.  patient instructed to remain in clinic for 15 minutes  afterwards, and to report any adverse reaction to me immediately.    Drug Name:proparacaine hydrochloride ophthalmic solution usp 0.5%   Dose: 2 drops right eye  Route administered: eye drop  NDC #: 29982-172-75  Amount of waste(mL): 4.5  Reason for waste: Single use bottle    LOT #: [id number]  SITE: right eye  : [employer]  EXPIRATION DATE: 01/2026    Drug Administration Record     verified patient identity using patient's name and date of birth.   patient instructed to remain in clinic for 15 minutes  afterwards, and to report any adverse reaction to me immediately.    Drug Name: erythromycin ophthalmic ointment usp 0.5% (5mg/g)  Dose: 1 gram  Route administered: topical  NDC #: 1432200349  Amount of waste(mL):0  Reason for waste: Single use tube    LOT #: [id number]  SITE: right eye  : [employer]  EXPIRATION DATE: 06/2025

## 2024-09-27 NOTE — OP NOTE
Oculoplastic Surgery Operative Note    PREOPERATIVE DIAGNOSIS:    Advanced basal cell carcinoma of the right medial canthus and upper and lower eyelid, nasal side wall, and midface post Mohs resection.  2.    Positive periosteal margins and lacrimal sac right side.  POSTOPERATIVE DIAGNOSIS:    Advanced basal cell carcinoma of the right medial canthus and upper and lower eyelid, nasal side wall, and midface post Mohs resection.  2.    Positive periosteal margins and lacrimal sac right side.    PROCEDURE:   Right orbitotomy with bone removal for resection of basal cell carcinoma   Right dacryocystectomy   Right upper and lower eyelid, medial canthal, nasal sidewall Mohs reconstruction of defect with myocutaneous flaps initial defect size 30 x 40 mm, final defect size 100 x 160 mm  Medial canthopexy    ANESTHESIA: General with local infiltration of 2% Lidocaine with epinephrine and 0.5% Marcaine in 50:50 mixture    SURGEON:  Lanre Linn MD  Co-Surgeon: Arelis Esparza MD    ASSISTANT: Elijah Gudino MD    ESTIMATED BLOOD LOSS:  40 mL    SPECIMEN:   ID Type Source Tests Collected by Time Destination   1 : Right Medial Canthus Periosteum Tissue Eye, Right SURGICAL PATHOLOGY EXAM Lanre Linn MD 9/26/2024  1:44 PM    2 : Right Lacrimal Sac Tissue Eye, Right SURGICAL PATHOLOGY EXAM Lanre Linn MD 9/26/2024  1:49 PM        INDICATIONS:  Manuel Hurd presented with a history of an advanced basal cell carcinoma involving his right medial canthal region. Imaging had been obtained prior to surgery given its firm adherence to the medial canthal region. Risks, benefits, and alternatives to the proposed procedure were discussed, and he elected to proceed. Of note, Dr. Narayanan, who performed the Mohs earlier was concerned about positive periosteal margins involving the lacrimal bone and nasal bone, and marked that area out for us. Additionally he noted positive margins deep to the medial canthal tendon in  the region of the lacrimal sac. Given extensive facial and nasal involvement, Dr. Esparza was consulted and graciously agreed to participate in the reconstruction.    PROCEDURE: Manuel Hurd was brought to the operating room and placed supine on the operating table. Under general anesthesia the above local anesthetic was infiltrated into the medial canthal region, the forehead, the mid face extending all the way to the right ear, and into the nasal sidewall.  Cocaine soaked cottonoids were placed into the right nare.  He was prepped and draped in typical sterile fashion.  Attention was directed to inspecting the defect.  It was noted he had complete loss of his upper and lower punctum on the right side.  About a third of the upper and lower eyelids was missing nasally as was the medial canthal tendon.  The lacrimal sac was exposed.  There was gentian violet marking as noted above in the area of desired bone removal due to positive periosteal margins.  We first began with the bone removal.  A monopolar cautery was used to incise through the remaining soft tissue and periosteum in the desired area of removal.  This periosteum was elevated off of the frontal process of the maxilla and extending into the area of the lacrimal bone.  This tissue was sent to pathology for permanent histopathology.  A Sonopet ultrasonic bone aspirator was used to begin the area of bone removal.  This was started on the area of the frontal process of the maxilla bone which was removed partial-thickness all the way down to the anterior lacrimal crest.  In the area of the anterior lacrimal crest we continue posteriorly with bone removal of the lacrimal bone in a full-thickness fashion exposing the underlying nasal mucosa.  Once the desired extent of bone removal was achieved, attention was directed to the lacrimal sac.  The anterior face of the lacrimal sac was violated during the initial Mohs resection and there was concern of positive  margins there.  Additionally given there was no residual punctum or canaliculus decision was made to proceed with dacryocystectomy. The remainder of the lacrimal sac was elevated, and some bone removal was continued down into the area of the nasolacrimal duct.  The proximal aspect of the nasolacrimal duct was truncated with Ilan scissors and the lacrimal sac was removed and sent to pathology.    Attention was now directed to reconstruction.  We began by performing a medial canthopexy.  This was achieved by using a 4-0 PDS suture through the upper and lower eyelids then 2 drill holes were made along the area of the posterior lacrimal crest, and the PDS was passed through these 2 holes and tied down.  This did pull the lid far nasally and in a nice trajectory.  Once the lids were in a good position attention was then directed to reconstructing the large soft tissue defect.  A Mustarde style myocutaneous rotation flap was marked out, as well as the nasolabial fold.  The myocutaneous rotation flap was carried all the way to the preauricular region and then extended below the ear.  A 15 blade was used to incise along this area in the subciliary fashion then extending temporally and then around the ear.  The nasolabial fold was also incised.  A large rotation flap was elevated.  Care was taken to stay superficial to avoid the facial nerve.  Deep anchoring sutures were placed to the superior lateral orbital rim and this was secured to the rotation flap nicely elevating the flap.  Medially it was far more challenging as there was a lack of bone and periosteum to secure the flap.  Drill holes were made in the nasal bone, then multiple 3-0 and 4-0 PDS sutures were passed through these drill holes and secured to the flap to anchor it medially.  Deep closure of this flap was with 5 and 4-0 Monocryl, and superficial closure was with 5-0 nylon.  Dogears were removed near the lip to allow the lip to lay without tension.  The  remaining upper eyelid and medial canthal defect was inspected and a glabellar flap was marked out.  This was incised with a 15 blade and rotated into the defect.  The donor site was closed with deep 4-0 Monocryl and skin was closed with a combination of 5-0 nylon and 6-0 plain gut sutures.  The subciliary incision was closed with 6-0 plain gut sutures.  Bolster dressing was placed medial Weight try to maintain the concave appearance of the medial canthal angle.  Antibiotic ointment was applied.  A pressure dressing was applied.  Manuel Hurd tolerated the procedure well and left the operating room in stable condition.       Lanre Linn MD   This report was dictated using Dragon voice recognition software.

## 2024-09-29 NOTE — PROGRESS NOTES
[facility] [place] Procedure Note    Date of Service:  Sep 26, 2024  Surgery: Mohs micrographic surgery    Case 1  Repair Type: Oculoplastic surgery - Kaveh  Repair Size: n/a cm  Suture Material: n/a  Tumor Type: BCC  Location: R lower eyelid  Derm-Path Accession #: outside path  PreOp Size: 1.4x1.2 cm  PostOp Size: 4x2.5 cm  Mohs Accession #:   Level of Defect: bone (+ at nasal bone)      Procedure:  We discussed the principles of treatment and most likely complications including scarring, bleeding, infection, swelling, pain, crusting, nerve damage, large wound,  incomplete excision, wound dehiscence,  nerve damage, recurrence, and a second procedure may be recommended to obtain the best cosmetic or functional result.    Informed consent was obtained and the patient underwent the procedure as follows:  The patient was placed supine on the operating table.  The cancer was identified, outlined with a marker, and verified by the patient.  The entire surgical field was prepped with iodine.  The surgical site was anesthetized using chlorhexadine.      The area of clinically apparent tumor was debulked. The layer of tissue was then surgically excised using a #15 blade and was then transferred onto a specimen sheet maintaining the orientation of the specimen. Hemostasis was obtained using electrofulguration. The wound site was then covered with a dressing while the tissue samples were processed for examination.    The excised tissue was transported to the Mohs histology laboratory maintaining the tissue orientation.  The tissue specimen was relaxed so that the entire surgical margin was in a a single horizontal plane for sectioning and inked for precise mapping.  A precise reference map was drawn to reflect the sectioning of the specimen, colored inking of the margins, and orientation on the patient.  The tissue was processed using horizontal sectioning of the base and  continuous peripheral margins.  The histopathologic sections were reviewed in conjunction with the reference map.    Total blocks: 1    Total slides:  3    Residual tumor was identified and indicated in red on the reference map, identifying the location where further tissue excision was necessary. Therefore, an additional stage of Mohs Micrographic surgery was deemed necessary.     Stage II   The patient was returned to the operating room, and the area prepped in the usual manner. The residual tumor was excised using the reference map as a guide. The specimen was transfered to a labeled specimen sheet maintaining the orientation of the specimen. Hemostasis was obtained and the wound site was covered with a dressing while the tissue was processed for examination.     The excised tissue was transported to the Mohs histology laboratory maintaining orientation. The specimen margins were inked for precise mapping and a reference map was prepared for the is additional stage to maintain precise orientation as described above. The tissue was processed using horizontal sectioning of the base and continuous peripheral margins. The histopathologic sections were reviewed in conjunction with the reference map.     Total blocks: 2    Total slides:  6    Residual tumor was identified and indicated in red on the reference map, identifying the location where further tissue excision was necessary. Therefore, an additional stage of Mohs Micrographic surgery was deemed necessary.     Stage III   The patient was returned to the operating room, and the area prepped in the usual manner. The residual tumor was excised using the reference map as a guide. The specimen was transfered to a labeled specimen sheet maintaining the orientation of the specimen. Hemostasis was obtained and the wound site was covered with a dressing while the tissue was processed for examination.     The excised tissue was transported to the Mohs histology laboratory  maintaining orientation. The specimen margins were inked for precise mapping and a reference map was prepared for the is additional stage to maintain precise orientation as described above. The tissue was processed using horizontal sectioning of the base and continuous peripheral margins. The histopathologic sections were reviewed in conjunction with the reference map.     Total blocks: 1    Total slides:  3    There were no cancer cells visualized on examination of the soft tissues, therefore Mohs surgery was complete. The area of central bony positivity was marked.  I spoke with OPS and confirmed the plan for bony removal and reconstruction.    The patient was discharged to the 5th floor [facility] in good condition.    Attending attestation:  I personally performed the entire procedure.  I have reviewed the note and edited it as necessary, and agree with its contents.    Marino Narayanan M.D.  Professor  Director of Dermatologic Surgery  Department of Dermatology  [facility]    Dermatology Surgery Clinic  [facility]  [address] [place] [id number]

## 2024-09-29 NOTE — ANESTHESIA POSTPROCEDURE EVALUATION
Patient: Manuel Hurd    Procedure: Procedure(s):  Right medial canthus Mohs closure.  right orbitotomy and resection of mass and bone removal.  Sonopet       Anesthesia Type:  General    Note:  Disposition: Outpatient   Postop Pain Control: Uneventful            Sign Out: Well controlled pain   PONV: No   Neuro/Psych: Uneventful            Sign Out: Acceptable/Baseline neuro status   Airway/Respiratory: Uneventful            Sign Out: Acceptable/Baseline resp. status   CV/Hemodynamics: Uneventful            Sign Out: Acceptable CV status; No obvious hypovolemia; No obvious fluid overload   Other NRE: NONE   DID A NON-ROUTINE EVENT OCCUR?            Last vitals:  Vitals Value Taken Time   /78 09/26/24 1700   Temp 36.4  C (97.6  F) 09/26/24 1700   Pulse 66 09/26/24 1700   Resp 16 09/26/24 1700   SpO2 94 % 09/26/24 1700       Electronically Signed By: Wade Flanagan MD  September 29, 2024  4:30 PM

## 2024-09-30 ENCOUNTER — TELEPHONE (OUTPATIENT)
Dept: OPHTHALMOLOGY | Facility: CLINIC | Age: 69
End: 2024-09-30
Payer: COMMERCIAL

## 2024-10-01 LAB
PATH REPORT.COMMENTS IMP SPEC: ABNORMAL
PATH REPORT.COMMENTS IMP SPEC: ABNORMAL
PATH REPORT.COMMENTS IMP SPEC: YES
PATH REPORT.FINAL DX SPEC: ABNORMAL
PATH REPORT.GROSS SPEC: ABNORMAL
PATH REPORT.MICROSCOPIC SPEC OTHER STN: ABNORMAL
PATH REPORT.RELEVANT HX SPEC: ABNORMAL
PHOTO IMAGE: ABNORMAL

## 2024-10-05 ENCOUNTER — TELEPHONE (OUTPATIENT)
Dept: OPHTHALMOLOGY | Facility: CLINIC | Age: 69
End: 2024-10-05
Payer: COMMERCIAL

## 2024-10-05 NOTE — TELEPHONE ENCOUNTER
Telephone Note    I was contacted by the patient and his wife. The following information was obtained via phone call with this provider.      Patient is a 68-year-old  with  past ocular history of BCC who is Post op day #9 following right medial canthus Mohs closure (9/26/24). Patient's wife noticed slight increase in redness and irritation to touch this morning. Patient denies any persistent pain without contact, no mucopurulent discharge from the surgical site. Good adherence to erythromycin ointment          I conveyed to the patient and his wife, Evelyn,  that a  evaluation would be necessary for me to provide an active role in the patient's care. Given the reported examination findings, I emphasized the importance of ophthalmology evaluation  (patient has clinic follow up with  on 10/8/24)) and I offered to see the patient in the ED if they deemed it appropriate. Counseled the importance of continued antibiotic ointment administration to surgical site and discussed warning symptoms of infection. Patient and his wife were agreeable to the plan and voiced satisfaction.    Evans Arnett MD  Resident Physician, PGY-2  Department of Ophthalmology

## 2024-10-08 ENCOUNTER — OFFICE VISIT (OUTPATIENT)
Dept: OPHTHALMOLOGY | Facility: CLINIC | Age: 69
End: 2024-10-08
Payer: COMMERCIAL

## 2024-10-08 DIAGNOSIS — C44.1122 BASAL CELL CARCINOMA OF RIGHT LOWER EYELID: ICD-10-CM

## 2024-10-08 PROCEDURE — 99024 POSTOP FOLLOW-UP VISIT: CPT | Performed by: OPHTHALMOLOGY

## 2024-10-08 RX ORDER — ERYTHROMYCIN 5 MG/G
OINTMENT OPHTHALMIC
Qty: 7 G | Refills: 4 | Status: SHIPPED | OUTPATIENT
Start: 2024-10-08

## 2024-10-08 ASSESSMENT — VISUAL ACUITY
OS_SC: 20/25
OS_SC+: -1
OD_SC+: -1
METHOD: SNELLEN - LINEAR
OD_SC: 20/30

## 2024-10-08 ASSESSMENT — TONOMETRY
OS_IOP_MMHG: 15
IOP_METHOD: ICARE
OD_IOP_MMHG: 16

## 2024-10-08 NOTE — PROGRESS NOTES
Chief Complaint(s) and History of Present Illness(es)     Follow Up            Comments: S/p Right medial canthus Mohs closure/right orbitotomy and   resection of mass and bone removal 9/26/24               Comments    No pain in general unless nose is touched. Patient reports he is not   having any issues or concerns to report.   Patients wife reports some concern with red along incision line if this is   normal.  No fever or discharge.     Luann Candelaria, COT COT 9:08 AM October 8, 2024           Doing well overall. The tip of the rotation flap is partially necrosed, which was debrided down to bleeding tissue.   Sutures removed as was the xeroform.     Lower lid in good position with no lagophthalmos.    I asked him to use warm compresses three times a day, Use Vaseline or Aquaphor along the incisions, except the areas around the eye, he should use erythromycin ophthalmic ointment.     There are no Patient Instructions on file for this visit.  Return in about 4 months (around 2/8/2025).      Attending Physician Attestation: Complete documentation of historical and exam elements from today's encounter can be found in the full encounter summary report (not reduplicated in this progress note). I personally obtained the chief complaint(s) and history of present illness. I confirmed and edited as necessary the review of systems, past medical/surgical history, family history, social history, and examination findings as documented by others; and I examined the patient myself. I personally reviewed the relevant tests, images, and reports as documented above. I formulated and edited as necessary the assessment and plan and discussed the findings and management plan with the patient and family. I personally reviewed the ophthalmic test(s) associated with this encounter, agree with the interpretation(s) as documented by the resident/fellow, and have edited the corresponding report(s) as necessary. Lanre Linn MD

## 2024-10-08 NOTE — Clinical Note
IKEI doing overall well the tip of the rotation flap (the area along the nasal sidewall doesn't have great take, but lower lid in excellent position. I've sent two messages to have follow up with you but not scheduled. Thanks again for your help!

## 2024-10-08 NOTE — NURSING NOTE
Chief Complaints and History of Present Illnesses   Patient presents with    Follow Up     S/p Right medial canthus Mohs closure/right orbitotomy and resection of mass and bone removal 9/26/24          Chief Complaint(s) and History of Present Illness(es)       Follow Up              Comments: S/p Right medial canthus Mohs closure/right orbitotomy and resection of mass and bone removal 9/26/24                   Comments    No pain in general unless nose is touched. Patient reports he is not having any issues or concerns to report.   Patients wife reports some concern with red along incision line if this is normal.  No fever or discharge.     Luann Candelaria, COT COT 9:08 AM October 8, 2024

## 2024-10-22 ENCOUNTER — TELEPHONE (OUTPATIENT)
Dept: OTOLARYNGOLOGY | Facility: CLINIC | Age: 69
End: 2024-10-22
Payer: COMMERCIAL

## 2024-10-30 ENCOUNTER — OFFICE VISIT (OUTPATIENT)
Dept: OTOLARYNGOLOGY | Facility: CLINIC | Age: 69
End: 2024-10-30
Payer: COMMERCIAL

## 2024-10-30 VITALS — HEIGHT: 69 IN | WEIGHT: 200 LBS | BODY MASS INDEX: 29.62 KG/M2

## 2024-10-30 DIAGNOSIS — I96: Primary | ICD-10-CM

## 2024-10-30 DIAGNOSIS — T86.821: Primary | ICD-10-CM

## 2024-10-30 PROCEDURE — 97597 DBRDMT OPN WND 1ST 20 CM/<: CPT | Performed by: OTOLARYNGOLOGY

## 2024-10-30 NOTE — PROGRESS NOTES
Facial Plastic and Reconstructive Surgery      Manuel Hurd presents today for surgical follow up. He had a large cervicofacial flap for closure of the right cheek, right lower lid and right glabellar flap. He has some superficial tissue loss of the most medial and superior aspect of the flap.     PROCEDURE: wound debridement 3cm by 1cm  INDICATION: partial thickness nasal flap necrosis  SURGEON: Arelis Esparza MD      Timeout was performed identifying the patient's name site of surgery and procedure to be performed.  Informed consent was signed prior to the procedure.  The area was prepped and draped in usual sterile fashion.     A 15 blade scalpel was used to debride the scab over the area. This led to nicely vascularized tissue bed. The medial canthal area I kept intact. I placed bacitracin and vaseline impregnated gauze over it.     Patient tolerated the procedure well without any complications.  Postoperative instructions were given.    A/P:  BID wound care  Return in one month for follow up

## 2024-10-30 NOTE — LETTER
10/30/2024       RE: Manuel Hurd  [address]   [address]  [place] [facility] [address]     Dear Colleague,    Thank you for referring your patient, Manuel Hurd, to the [facility] [place] at [facility]. Please see a copy of my visit note below.    Facial Plastic and Reconstructive Surgery      Manuel Hurd presents today for surgical follow up. He had a large cervicofacial flap for closure of the right cheek, right lower lid and right glabellar flap. He has some superficial tissue loss of the most medial and superior aspect of the flap.     PROCEDURE: wound debridement 3cm by 1cm  INDICATION: partial thickness nasal flap necrosis  SURGEON: Arelis Esparza MD      Timeout was performed identifying the patient's name site of surgery and procedure to be performed.  Informed consent was signed prior to the procedure.  The area was prepped and draped in usual sterile fashion.     A 15 blade scalpel was used to debride the scab over the area. This led to nicely vascularized tissue bed. The medial canthal area I kept intact. I placed bacitracin and vaseline impregnated gauze over it.     Patient tolerated the procedure well without any complications.  Postoperative instructions were given.    A/P:  BID wound care  Return in one month for follow up      Again, thank you for allowing me to participate in the care of your patient.      Sincerely,    Arelis Esparza MD

## 2024-11-04 ENCOUNTER — MYC MEDICAL ADVICE (OUTPATIENT)
Dept: OTOLARYNGOLOGY | Facility: CLINIC | Age: 69
End: 2024-11-04
Payer: COMMERCIAL

## 2024-11-18 ENCOUNTER — MYC MEDICAL ADVICE (OUTPATIENT)
Dept: OTOLARYNGOLOGY | Facility: CLINIC | Age: 69
End: 2024-11-18
Payer: COMMERCIAL

## 2024-11-25 ENCOUNTER — MYC MEDICAL ADVICE (OUTPATIENT)
Dept: OTOLARYNGOLOGY | Facility: CLINIC | Age: 69
End: 2024-11-25
Payer: COMMERCIAL

## 2024-12-04 ENCOUNTER — ALLIED HEALTH/NURSE VISIT (OUTPATIENT)
Dept: OTOLARYNGOLOGY | Facility: CLINIC | Age: 69
End: 2024-12-04
Payer: COMMERCIAL

## 2024-12-04 DIAGNOSIS — Z98.890 POSTOPERATIVE STATE: Primary | ICD-10-CM

## 2024-12-04 PROCEDURE — 99207 PR NO CHARGE NURSE ONLY: CPT

## 2024-12-04 NOTE — PROGRESS NOTES
Pt comes in for wound care follow up.    Right nasal sidewall continues to heal and has improved significantly since his last visit.    Dr. Esparza applied silver nitrate to an area of hypergranulation.    We discussed silicone scar treatment to incision lines, and continue to keep open area of wound moist with Aquaphor.    Follow-up with Dr. Linn scheduled in February.  Follow-up with Dr. Esparza prn.    Crystal Sawant RN  12/4/2024 4:14 PM

## 2025-02-05 ENCOUNTER — OFFICE VISIT (OUTPATIENT)
Dept: OPHTHALMOLOGY | Facility: CLINIC | Age: 70
End: 2025-02-05
Payer: COMMERCIAL

## 2025-02-05 DIAGNOSIS — H02.539 EYELID RETRACTION OR LAG: ICD-10-CM

## 2025-02-05 DIAGNOSIS — C44.111 BASAL CELL CARCINOMA (BCC) OF RIGHT ORBIT: ICD-10-CM

## 2025-02-05 DIAGNOSIS — C44.1122 BASAL CELL CARCINOMA OF RIGHT LOWER EYELID: Primary | ICD-10-CM

## 2025-02-05 RX ORDER — VALACYCLOVIR HYDROCHLORIDE 1 G/1
1000 TABLET, FILM COATED ORAL 3 TIMES DAILY
COMMUNITY
Start: 2025-02-04 | End: 2025-02-11

## 2025-02-05 ASSESSMENT — EXTERNAL EXAM - LEFT EYE: OS_EXAM: NORMAL

## 2025-02-05 ASSESSMENT — VISUAL ACUITY
OD_CC+: -2
METHOD: SNELLEN - LINEAR
OD_CC: 20/30
OS_CC: 20/20
CORRECTION_TYPE: GLASSES

## 2025-02-05 ASSESSMENT — TONOMETRY
OS_IOP_MMHG: 13
OD_IOP_MMHG: 15
IOP_METHOD: ICARE

## 2025-02-05 ASSESSMENT — SLIT LAMP EXAM - LIDS: COMMENTS: NORMAL

## 2025-02-05 ASSESSMENT — EXTERNAL EXAM - RIGHT EYE: OD_EXAM: NORMAL

## 2025-02-05 NOTE — PROGRESS NOTES
Chief Complaint(s) and History of Present Illness(es)     Post Op (Ophthalmology) Right Eye            Laterality: right eye          Comments    S/P Right medial canthus Mohs closure, right orbitotomy and resection of   mass and bone removal 9/26/2024. Overall recovery has been going well. He   does have some tearing of the right side. He has not been using any eye   drops presently.     He does continue on the scarring ointment to the incision on the right   lower cheek.    He did get diagnosed with shingles yesterday. No facial lesions. On oral   Valtrex.                 Assessment & Plan     Manuel Hurd is a 69 year old male with the following diagnoses:   Encounter Diagnoses   Name Primary?    Basal cell carcinoma of right lower eyelid Yes    Basal cell carcinoma (BCC) of right orbit     Eyelid retraction or lag      Massage lower lid in an upward direction.  Get new baseline CT orbit   See dermatology for full body skin check. They would like to schedule at [place], closer to home.  Schedule right lower eyelid cicatricial retraction repair with full thickness skin graft from likely left preauricular area in several months. Temporary tarsorrhaphy.          Attending Physician Attestation: Complete documentation of historical and exam elements from today's encounter can be found in the full encounter summary report (not reduplicated in this progress note). I personally obtained the chief complaint(s) and history of present illness. I confirmed and edited as necessary the review of systems, past medical/surgical history, family history, social history, and examination findings as documented by others; and I examined the patient myself. I personally reviewed the relevant tests, images, and reports as documented above. I formulated and edited as necessary the assessment and plan and discussed the findings and management plan with the patient.  -Lanre Linn MD    Today with Manuel Hurd  and his  wife, I reviewed the indications, risks, benefits, and alternatives of the proposed surgical procedure including, but not limited to, failure obtain the desired result  and need for additional surgery, bleeding, infection, loss of vision, loss of the eye, and the remote possibility of permanent damage to any organ system or death with the use of anesthesia. There is a very real risk of graft necrosis. I provided multiple opportunities for the questions, answered all questions to the best of my ability, and confirmed that my answers and my discussion were understood.   Lanre Linn MD

## 2025-02-05 NOTE — NURSING NOTE
Chief Complaints and History of Present Illnesses   Patient presents with    Post Op (Ophthalmology) Right Eye     Chief Complaint(s) and History of Present Illness(es)       Post Op (Ophthalmology) Right Eye              Laterality: right eye              Comments    S/P Right medial canthus Mohs closure, right orbitotomy and resection of mass and bone removal 9/26/2024. Overall recovery has been going well. He does have some tearing of the right side. He has not been using any eye drops presently.     He does continue on the scarring ointment to the incision on the right lower cheek.    He did get diagnosed with shingles yesterday. No facial lesions. On oral Valtrex.

## 2025-03-21 ENCOUNTER — TRANSFERRED RECORDS (OUTPATIENT)
Dept: HEALTH INFORMATION MANAGEMENT | Facility: CLINIC | Age: 70
End: 2025-03-21
Payer: COMMERCIAL

## 2025-03-31 RX ORDER — ACETAMINOPHEN 325 MG/1
325-650 TABLET ORAL EVERY 6 HOURS PRN
COMMUNITY

## 2025-03-31 RX ORDER — LISINOPRIL 10 MG/1
10 TABLET ORAL DAILY
COMMUNITY

## 2025-03-31 RX ORDER — NITROGLYCERIN 0.4 MG/1
0.4 TABLET SUBLINGUAL EVERY 5 MIN PRN
COMMUNITY

## 2025-03-31 RX ORDER — BACITRACIN ZINC 500 [USP'U]/G
OINTMENT TOPICAL 2 TIMES DAILY
Status: ON HOLD | COMMUNITY
End: 2025-04-04

## 2025-04-02 ENCOUNTER — ANCILLARY PROCEDURE (OUTPATIENT)
Dept: CT IMAGING | Facility: CLINIC | Age: 70
End: 2025-04-02
Attending: OPHTHALMOLOGY
Payer: COMMERCIAL

## 2025-04-02 DIAGNOSIS — C44.1122 BASAL CELL CARCINOMA OF RIGHT LOWER EYELID: ICD-10-CM

## 2025-04-02 DIAGNOSIS — C44.111 BASAL CELL CARCINOMA (BCC) OF RIGHT ORBIT: ICD-10-CM

## 2025-04-02 PROCEDURE — 70481 CT ORBIT/EAR/FOSSA W/DYE: CPT | Performed by: RADIOLOGY

## 2025-04-02 RX ORDER — IOPAMIDOL 755 MG/ML
75 INJECTION, SOLUTION INTRAVASCULAR ONCE
Status: COMPLETED | OUTPATIENT
Start: 2025-04-02 | End: 2025-04-02

## 2025-04-02 RX ADMIN — IOPAMIDOL 75 ML: 755 INJECTION, SOLUTION INTRAVASCULAR at 13:47

## 2025-04-04 ENCOUNTER — HOSPITAL ENCOUNTER (OUTPATIENT)
Facility: CLINIC | Age: 70
Discharge: HOME | End: 2025-04-04
Attending: OPHTHALMOLOGY | Admitting: OPHTHALMOLOGY
Payer: MEDICARE

## 2025-04-04 VITALS
BODY MASS INDEX: 30.42 KG/M2 | DIASTOLIC BLOOD PRESSURE: 68 MMHG | WEIGHT: 212.5 LBS | HEART RATE: 67 BPM | RESPIRATION RATE: 14 BRPM | TEMPERATURE: 96.8 F | SYSTOLIC BLOOD PRESSURE: 124 MMHG | HEIGHT: 70 IN | OXYGEN SATURATION: 94 %

## 2025-04-04 DIAGNOSIS — C44.1122 BASAL CELL CARCINOMA OF RIGHT LOWER EYELID: Primary | ICD-10-CM

## 2025-04-04 PROCEDURE — 710N000012 HC RECOVERY PHASE 2, PER MINUTE: Performed by: OPHTHALMOLOGY

## 2025-04-04 PROCEDURE — 370N000017 HC ANESTHESIA TECHNICAL FEE, PER MIN: Performed by: OPHTHALMOLOGY

## 2025-04-04 PROCEDURE — 15260 FTH/GFT FR N/E/E/L 20 SQCM/<: CPT | Performed by: OPHTHALMOLOGY

## 2025-04-04 PROCEDURE — 272N000001 HC OR GENERAL SUPPLY STERILE: Performed by: OPHTHALMOLOGY

## 2025-04-04 PROCEDURE — 710N000009 HC RECOVERY PHASE 1, LEVEL 1, PER MIN: Performed by: OPHTHALMOLOGY

## 2025-04-04 PROCEDURE — 250N000025 HC SEVOFLURANE, PER MIN: Performed by: OPHTHALMOLOGY

## 2025-04-04 PROCEDURE — 250N000009 HC RX 250: Performed by: OPHTHALMOLOGY

## 2025-04-04 PROCEDURE — 360N000076 HC SURGERY LEVEL 3, PER MIN: Performed by: OPHTHALMOLOGY

## 2025-04-04 PROCEDURE — 999N000141 HC STATISTIC PRE-PROCEDURE NURSING ASSESSMENT: Performed by: OPHTHALMOLOGY

## 2025-04-04 PROCEDURE — 21280 MEDIAL CANTHOPEXY: CPT | Mod: RT | Performed by: OPHTHALMOLOGY

## 2025-04-04 PROCEDURE — 15004 WOUND PREP F/N/HF/G: CPT | Performed by: OPHTHALMOLOGY

## 2025-04-04 PROCEDURE — 250N000011 HC RX IP 250 OP 636: Performed by: OPHTHALMOLOGY

## 2025-04-04 RX ORDER — SODIUM CHLORIDE, SODIUM LACTATE, POTASSIUM CHLORIDE, CALCIUM CHLORIDE 600; 310; 30; 20 MG/100ML; MG/100ML; MG/100ML; MG/100ML
INJECTION, SOLUTION INTRAVENOUS CONTINUOUS
Status: DISCONTINUED | OUTPATIENT
Start: 2025-04-04 | End: 2025-04-04 | Stop reason: HOSPADM

## 2025-04-04 RX ORDER — FENTANYL CITRATE 50 UG/ML
50 INJECTION, SOLUTION INTRAMUSCULAR; INTRAVENOUS EVERY 5 MIN PRN
Status: DISCONTINUED | OUTPATIENT
Start: 2025-04-04 | End: 2025-04-04 | Stop reason: HOSPADM

## 2025-04-04 RX ORDER — ONDANSETRON 2 MG/ML
4 INJECTION INTRAMUSCULAR; INTRAVENOUS EVERY 30 MIN PRN
Status: DISCONTINUED | OUTPATIENT
Start: 2025-04-04 | End: 2025-04-04 | Stop reason: HOSPADM

## 2025-04-04 RX ORDER — ERYTHROMYCIN 5 MG/G
OINTMENT OPHTHALMIC
Qty: 3.5 G | Refills: 0 | Status: SHIPPED | OUTPATIENT
Start: 2025-04-09

## 2025-04-04 RX ORDER — MAGNESIUM HYDROXIDE 1200 MG/15ML
LIQUID ORAL PRN
Status: DISCONTINUED | OUTPATIENT
Start: 2025-04-04 | End: 2025-04-04 | Stop reason: HOSPADM

## 2025-04-04 RX ORDER — ONDANSETRON 4 MG/1
4 TABLET, ORALLY DISINTEGRATING ORAL EVERY 30 MIN PRN
Status: DISCONTINUED | OUTPATIENT
Start: 2025-04-04 | End: 2025-04-04 | Stop reason: HOSPADM

## 2025-04-04 RX ORDER — HYDROMORPHONE HCL IN WATER/PF 6 MG/30 ML
0.2 PATIENT CONTROLLED ANALGESIA SYRINGE INTRAVENOUS EVERY 5 MIN PRN
Status: DISCONTINUED | OUTPATIENT
Start: 2025-04-04 | End: 2025-04-04 | Stop reason: HOSPADM

## 2025-04-04 RX ORDER — OXYCODONE HYDROCHLORIDE 5 MG/1
10 TABLET ORAL
Status: DISCONTINUED | OUTPATIENT
Start: 2025-04-04 | End: 2025-04-04 | Stop reason: HOSPADM

## 2025-04-04 RX ORDER — HYDROMORPHONE HCL IN WATER/PF 6 MG/30 ML
0.4 PATIENT CONTROLLED ANALGESIA SYRINGE INTRAVENOUS EVERY 5 MIN PRN
Status: DISCONTINUED | OUTPATIENT
Start: 2025-04-04 | End: 2025-04-04 | Stop reason: HOSPADM

## 2025-04-04 RX ORDER — DEXAMETHASONE SODIUM PHOSPHATE 4 MG/ML
4 INJECTION, SOLUTION INTRA-ARTICULAR; INTRALESIONAL; INTRAMUSCULAR; INTRAVENOUS; SOFT TISSUE
Status: DISCONTINUED | OUTPATIENT
Start: 2025-04-04 | End: 2025-04-04 | Stop reason: HOSPADM

## 2025-04-04 RX ORDER — FENTANYL CITRATE 50 UG/ML
25 INJECTION, SOLUTION INTRAMUSCULAR; INTRAVENOUS EVERY 5 MIN PRN
Status: DISCONTINUED | OUTPATIENT
Start: 2025-04-04 | End: 2025-04-04 | Stop reason: HOSPADM

## 2025-04-04 RX ORDER — NALOXONE HYDROCHLORIDE 0.4 MG/ML
0.1 INJECTION, SOLUTION INTRAMUSCULAR; INTRAVENOUS; SUBCUTANEOUS
Status: DISCONTINUED | OUTPATIENT
Start: 2025-04-04 | End: 2025-04-04 | Stop reason: HOSPADM

## 2025-04-04 RX ORDER — BACITRACIN ZINC 500 [USP'U]/G
OINTMENT TOPICAL
Qty: 28.4 G | Refills: 0 | Status: SHIPPED | OUTPATIENT
Start: 2025-04-04 | End: 2025-04-25

## 2025-04-04 RX ORDER — OXYCODONE HYDROCHLORIDE 5 MG/1
5 TABLET ORAL
Status: DISCONTINUED | OUTPATIENT
Start: 2025-04-04 | End: 2025-04-04 | Stop reason: HOSPADM

## 2025-04-04 RX ORDER — ERYTHROMYCIN 5 MG/G
OINTMENT OPHTHALMIC PRN
Status: DISCONTINUED | OUTPATIENT
Start: 2025-04-04 | End: 2025-04-04 | Stop reason: HOSPADM

## 2025-04-04 ASSESSMENT — ACTIVITIES OF DAILY LIVING (ADL)
ADLS_ACUITY_SCORE: 41

## 2025-04-04 NOTE — DISCHARGE INSTRUCTIONS
Post-operative Instructions  Ophthalmic Plastic and Reconstructive Surgery    Lanre Linn M.D.     All instructions apply to the operated eye(s) or eyelid(s).    Wound care and personal care  ? Leave dressing in place until seen in clinic.   ? DO NOT APPLY ICE BY YOUR EYE.   ? You may shower or wash your hair the day after surgery. Do not go swimming for at least 2 weeks to prevent contamination of your wounds.  ? Do not apply make-up to the eyes or eyelids for 2 weeks after surgery.  ? Expect some swelling, bruising, black eye (even into the lower eyelids and cheeks). Also expect serum caking, crusting and discharge from the eye and/or incisions. A small amount of surface bleeding, and depending on the type of surgery, bleeding from the inside of the eyelid, is normal for the first 48 hours. Vision can become blurry from blood tinged tears or drops and ointment in your eyes.   ? You may go for walks and light activity is ok, but no heavy (over 15 pounds) lifting, bending or excessive straining for one week.   ? Sleeping with your head elevated, such as in a recliner, for the first several days can help swelling resolve more quickly.   ? Do continue to ambulate (walk) as you normally would - being sedentary after surgery can cause blood clots.   ? Your eye(s) and eyelid(s) may be painful and tender. This is normal after surgery.      Contact information and follow-up  ? Return to the Eye Clinic for a follow-up appointment with your physician as scheduled. If no appointment has been scheduled:   - [phone] for an appointment with Dr. Linn within 2 to 3 weeks from your date of surgery.     ? For severe pain, bleeding, or loss of vision, call the [facility] at [phone].    After hours or on weekends and holidays, call [phone] and ask to speak with the ophthalmologist on call.    An on call person can be reached after hours for concerns. The on call doctor should not  call in medication refill requests after hours or on weekends, so please plan accordingly.     Medications  ? Avoid Aspirin and ibuprofen for 3 days to reduce the risk of bleeding. You may take Tylenol (acetaminophen).  ? In addition to your home medications, take the following post-operative medications as prescribed by your physician:    ? Apply antibiotic ointment to all sutures three times a day. By your eye you can start this after the dressing is removed and you will use the prescribed erythromycin. By the ear use Bacitracin three times a day.   Once you run out, you can apply Vaseline or Aquaphor (over the counter) to the incisions. Don't put the Vaseline or Aquaphor into your eyes.   ? If you have ocular irritation, you can use over the counter artificial tears such as Refresh, Systane, or Blink. Do not use Visine, Clear Eyes, or any other drop that gets the red out.       Same Day Surgery Discharge Instructions for  Sedation and General Anesthesia     It's not unusual to feel dizzy, light-headed or faint for up to 24 hours after surgery or while taking pain medication.  If you have these symptoms: sit for a few minutes before standing and have someone assist you when you get up to walk or use the bathroom.    You should rest and relax for the next 24 hours. We recommend you make arrangements to have an adult stay with you for at least 24 hours after your discharge.  Avoid hazardous and strenuous activity.    DO NOT DRIVE any vehicle or operate mechanical equipment for 24 hours following the end of your surgery.  Even though you may feel normal, your reactions may be affected by the medication you have received.    Do not drink alcoholic beverages for 24 hours following surgery.     Slowly progress to your regular diet as you feel able. It's not unusual to feel nauseated and/or vomit after receiving anesthesia.  If you develop these symptoms, drink clear liquids (apple juice, ginger ale, broth, 7-up, etc. )  until you feel better.  If your nausea and vomiting persists for 24 hours, please notify your surgeon.      All narcotic pain medications, along with inactivity and anesthesia, can cause constipation. Drinking plenty of liquids and increasing fiber intake will help.    For any questions of a medical nature, call your surgeon.    Do not make important decisions for 24 hours.    If you had general anesthesia, you may have a sore throat for a couple of days related to the breathing tube used during surgery.  You may use Cepacol lozenges to help with this discomfort.  If it worsens or if you develop a fever, contact your surgeon.     If you feel your pain is not well managed with the pain medications prescribed by your surgeon, please contact your surgeon's office to let them know so they can address your concerns.            **If you have questions or concerns about your procedure,   call Dr. Linn at [phone] [facility] and [phone] [place]**

## 2025-04-04 NOTE — OP NOTE
Oculoplastic Surgery Operative Note    PREOPERATIVE DIAGNOSIS:    History of neglected right medial canthus basal cell carcinoma status post resection with extensive reconstruction.  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     POSTOPERATIVE DIAGNOSIS:    History of neglected right medial canthus basal cell carcinoma status postresection with extensive reconstruction.  Right lower eyelid 6 show COVID infection and medial canthal dystopia.    PROCEDURE:   Right lower eyelid cicatricial retraction repair with full-thickness skin graft measuring 3 cm x 1 cm from left preauricular area.  Right medial canthopexy.  Right release of lower eyelids cicatrix.  Right temporary tarsorrhaphy.    ANESTHESIA: General With local infiltration of 2% Lidocaine with epinephrine and 0.5% Marcaine in 50:50 mixture    SURGEON:  Lanre Linn MD    ASSISTANT: None    ESTIMATED BLOOD LOSS: 3 mL    SPECIMEN: * No specimens in log *    INDICATIONS:  Manuel Hurd presented with a history of a right advanced basal cell carcinoma.  This been resected and reconstructed with extensive flaps.  He did have some bone loss from the tumor.  We discussed risk benefits and alternatives to addressing his cicatricial eyelid retraction and medial Dystopia which was causing significant irritation.  He decided to proceed. Risks, benefits, and alternatives to the proposed procedure were discussed, and he elected to proceed.    PROCEDURE: Manuel Hurd was brought to the operating room and placed supine on the operating table. Under general anesthesia local anesthetic as well was infiltrated into the right medial canthal area and right lower eyelid as well as the left preauricular area he was.  Prepped and draped in the sterile fashion.  Attention was directed to the right side.  A 4-0 silk suture was placed through the right lower eyelid.  This was used as a traction suture.  A 15 blade was used to incise the  skin along his old incision lines in a U-shaped fashion around the medial canthal area.  Dissection was carried out with Ilan scissors in this dissection and up going into the anterior medial orbit to release as much cicatrix as I could until the medial canthal angle was nicely mobilized to allow it to be brought up superiorly.  We continued to release cicatrix down to the inferior orbital rim as well.  4-0 PDS suture was passed through the medial commissure which is a reconstructed commissure.  This was passed in a mattress fashion then passed through the deep anterior medial orbits.  He has no bone in this area and we were able to grab some mucosal along the anterior aspect of the middle turbinate to perform the medial canthopexy.  The defect was then measured after the medial canthopexy had been performed and a 15 blade was used to incise the skin in the left preauricular area.  The skin graft was harvested.  The donor site was closed with deep 5-0 Monocryl and 5-0 plain gut superficial.  The skin graft was brought into the recipient site.  A 7-0 Vicryl suture was passed through the deep tissue and grasped the periosteum then this was brought through the center of the skin graft to secured into place.  Multiple 7-0 Vicryl sutures were placed around the skin graft.  6-0 plain gut sutures were used to close the remainder of it.  A bolster dressing was placed on top of the skin graft.  A temporary tarsorrhaphy was fashioned with the previously placed traction sutures using Steri-Strips and Mastisol to the forehead.  He tolerated the procedure well. He left the operating room in stable condition.       Lanre Linn MD   This report was dictated using Dragon voice recognition software.

## 2025-04-09 ENCOUNTER — OFFICE VISIT (OUTPATIENT)
Dept: OPHTHALMOLOGY | Facility: CLINIC | Age: 70
End: 2025-04-09
Payer: COMMERCIAL

## 2025-04-09 DIAGNOSIS — C44.1122 BASAL CELL CARCINOMA OF RIGHT LOWER EYELID: Primary | ICD-10-CM

## 2025-04-09 DIAGNOSIS — H02.539 EYELID RETRACTION OR LAG: ICD-10-CM

## 2025-04-09 RX ORDER — ICOSAPENT ETHYL 1 G/1
1 CAPSULE ORAL
COMMUNITY

## 2025-04-09 ASSESSMENT — VISUAL ACUITY
OD_SC: 20/50
OD_SC+: -2
OS_SC: 20/60
METHOD: SNELLEN - LINEAR
OS_SC+: -1

## 2025-04-09 NOTE — PATIENT INSTRUCTIONS
"- Apply warm compresses for 1 minute two to three times a day until your bruising has resolved. You can continue this for one more month.   - Apply Aquaphor or Vaseline 2-3 x daily to the incision.    - If you have symptoms of eye irritation, it is good to use over the counter artificial tears. Good brands include Refresh, Blink, and Systane. Do NOT get drops that are for \"red eyes.\"   - It is normal for the incision to appear raised, red, itch and have small lumps. You can gently massage any small bumps along the incision line. These can take up to six months to resolve.    "

## 2025-04-09 NOTE — PROGRESS NOTES
"Chief Complaint(s) and History of Present Illness(es)     Post Op (Ophthalmology) Right Eye            Laterality: right eye          Comments    S/p Right lower eyelid cicatricial retraction repair with full-thickness   skin graft measuring 3 cm x 1 cm from left preauricular area.  Right   medial canthopexy.  Right release of lower eyelids cicatrix.  Right   temporary tarsorrhaphy 4/4/25. States one of the strings came loose that   was over his eye (tarsorrhaphy) and they taped it back up above brow.   Notes the eye area has a funny smell. Waiting to use erythromycin ointment   until after today's appt, using bacitracin on ear.               Doing well. Good skin graft take. Good lid position.    Patient Instructions   - Apply warm compresses for 1 minute two to three times a day until your bruising has resolved. You can continue this for one more month.   - Apply Aquaphor or Vaseline 2-3 x daily to the incision.    - If you have symptoms of eye irritation, it is good to use over the counter artificial tears. Good brands include Refresh, Blink, and Systane. Do NOT get drops that are for \"red eyes.\"   - It is normal for the incision to appear raised, red, itch and have small lumps. You can gently massage any small bumps along the incision line. These can take up to six months to resolve.    Return in about 2 months (around 6/9/2025).      Attending Physician Attestation: Complete documentation of historical and exam elements from today's encounter can be found in the full encounter summary report (not reduplicated in this progress note). I personally obtained the chief complaint(s) and history of present illness. I confirmed and edited as necessary the review of systems, past medical/surgical history, family history, social history, and examination findings as documented by others; and I examined the patient myself. I personally reviewed the relevant tests, images, and reports as documented above. I formulated and " edited as necessary the assessment and plan and discussed the findings and management plan with the patient and family. I personally reviewed the ophthalmic test(s) associated with this encounter, agree with the interpretation(s) as documented by the resident/fellow, and have edited the corresponding report(s) as necessary. Lanre Linn MD

## 2025-04-09 NOTE — NURSING NOTE
Chief Complaints and History of Present Illnesses   Patient presents with    Post Op (Ophthalmology) Right Eye       Chief Complaint(s) and History of Present Illness(es)       Post Op (Ophthalmology) Right Eye              Laterality: right eye              Comments    S/p Right lower eyelid cicatricial retraction repair with full-thickness skin graft measuring 3 cm x 1 cm from left preauricular area.  Right medial canthopexy.  Right release of lower eyelids cicatrix.  Right temporary tarsorrhaphy 4/4/25. States one of the strings came loose that was over his eye (tarsorrhaphy) and they taped it back up above brow. Notes the eye area has a funny smell. Waiting to use erythromycin ointment until after today's appt, using bacitracin on ear.                   Josefina Masterson, COT

## 2025-06-18 ENCOUNTER — OFFICE VISIT (OUTPATIENT)
Dept: OPHTHALMOLOGY | Facility: CLINIC | Age: 70
End: 2025-06-18
Payer: COMMERCIAL

## 2025-06-18 DIAGNOSIS — H04.221 EPIPHORA DUE TO INSUFFICIENT DRAINAGE OF RIGHT SIDE: ICD-10-CM

## 2025-06-18 DIAGNOSIS — C44.1122 BASAL CELL CARCINOMA OF RIGHT LOWER EYELID: Primary | ICD-10-CM

## 2025-06-18 DIAGNOSIS — H02.539 EYELID RETRACTION OR LAG: ICD-10-CM

## 2025-06-18 ASSESSMENT — TONOMETRY
OS_IOP_MMHG: 17
OD_IOP_MMHG: 15
IOP_METHOD: ICARE

## 2025-06-18 ASSESSMENT — VISUAL ACUITY
CORRECTION_TYPE: GLASSES
OD_CC: 20/30
OD_CC+: -1
OS_CC: 20/40
METHOD: SNELLEN - LINEAR

## 2025-06-18 NOTE — PROGRESS NOTES
Chief Complaint(s) and History of Present Illness(es)     Post Op (Ophthalmology) Right Eye            Laterality: right eye          Comments    S/p Right lower eyelid cicatricial retraction repair with full-thickness   skin graft measuring 3 cm x 1 cm from left preauricular area.  Right   medial canthopexy.  Right release of lower eyelids cicatrix.  Right   temporary tarsorrhaphy 4/4/25. Doing well, no problems/concerns today. No   drops/ointments.    ----             Assessment & Plan     Manuel Hurd is a 69 year old male with the following diagnoses:   Encounter Diagnoses   Name Primary?    Basal cell carcinoma of right lower eyelid Yes    Eyelid retraction or lag     Epiphora due to insufficient drainage of right side      Manuel Hurd with a history of advanced basal cell carcinoma of the right medial canthus, upper and lower eyelid, and nasal sidewall with positive periosteal margins.  We resected his lacrimal sac as well as surrounding bone.  Resection and reconstruction was 9/26/2024.  Overall he healed quite well, though did develop some medial canthal dystopia and lower eyelid retraction.  He underwent right lower eyelid cicatricial retraction repair with full-thickness skin graft 4/4/25.    Overall he is doing well, epiphora is his main periocular concern, and he is bothered by the scar along his nasolabial fold.    On examination his medial canthal position is improved though not symmetric.  There is still right lower eyelid retraction.  He has a very high tear lake.  Cornea is clear.    We discussed multiple options.  He would like to consider CDCR with Boston tube and understands the risks including malposition, need for maintenance.  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    I will asked that he see Dr. Ting Levin to take a look inside his nose to see if it would make sense to lyse those adhesions prior or during a Boston tube procedure and to  discuss options of scar management along the nasolabial fold.    We could combine for these procedures if desired.    Will need a follow up surveillance scan April 2025.           Patient disposition:   No follow-ups on file.        Attending Physician Attestation: Complete documentation of historical and exam elements from today's encounter can be found in the full encounter summary report (not reduplicated in this progress note). I personally obtained the chief complaint(s) and history of present illness. I confirmed and edited as necessary the review of systems, past medical/surgical history, family history, social history, and examination findings as documented by others; and I examined the patient myself. I personally reviewed the relevant tests, images, and reports as documented above. I formulated and edited as necessary the assessment and plan and discussed the findings and management plan with the patient.  -Lanre Linn MD

## 2025-06-23 ENCOUNTER — TELEPHONE (OUTPATIENT)
Dept: OTOLARYNGOLOGY | Facility: CLINIC | Age: 70
End: 2025-06-23
Payer: COMMERCIAL

## 2025-06-23 NOTE — TELEPHONE ENCOUNTER
Left Voicemail (1st Attempt) and Sent Mychart (1st Attempt) for the patient to call back and schedule the following:    Appointment Type: Return ENT  Provider: Dr. Arelis Esparza   Appt date: CALLED TO OFFER  7/23, 7/30, 8/6  Specialty phone number: Direct

## 2025-06-26 NOTE — TELEPHONE ENCOUNTER
Left Voicemail (2nd Attempt) and Sent Mychart (2nd Attempt) for the patient to call back and schedule the following:    Appointment Type: Return ENT POST-OP  Provider: Dr. Arelis Esparza   Appt date: OFFER 7/23 @  Specialty phone number: Direct

## 2025-08-10 ENCOUNTER — HEALTH MAINTENANCE LETTER (OUTPATIENT)
Age: 70
End: 2025-08-10

## (undated) DEVICE — SU PLAIN FAST ABSORB 5-0 PC-1 18" 1915G

## (undated) DEVICE — SOL NACL 0.9% INJ 1000ML BAG 2B1324X

## (undated) DEVICE — VIAL DECANTER STERILE WHITE DYNJDEC06

## (undated) DEVICE — SU SILK 4-0 G-3DA 18" 783G

## (undated) DEVICE — EYE PREP BETADINE 5% SOLUTION 30ML 0065-0411-30

## (undated) DEVICE — ESU PENCIL SMOKE EVAC W/ROCKER SWITCH 0703-047-000

## (undated) DEVICE — SPONGE COTTONOID 2X1/2" 80-1406

## (undated) DEVICE — SU ETHILON 5-0 P-3 18" BLACK 698G

## (undated) DEVICE — SU PDS II 3-0 SH 27" Z316H

## (undated) DEVICE — SOL WATER IRRIG 1000ML BOTTLE 2F7114

## (undated) DEVICE — PACK OCULOPLATIC SEN15OCFSD

## (undated) DEVICE — Device

## (undated) DEVICE — EYE KNIFE CRESCENT 55DEG 373807

## (undated) DEVICE — PACK MINOR SBA15MIFSE

## (undated) DEVICE — SU PLAIN 6-0 TG140-8 18" 1735G

## (undated) DEVICE — ESU ELEC BLADE 2.75" COATED/INSULATED E1455

## (undated) DEVICE — BLADE KNIFE SURG 15 371115

## (undated) DEVICE — DRSG STERI STRIP 1/2X4" R1547

## (undated) DEVICE — SPONGE RAY-TEC 4X8" 7318

## (undated) DEVICE — SU PDS II 4-0 SH 27" Z315H

## (undated) DEVICE — TIP ASP SONOPET IQ 3.15X2.58MMX12CM APEX 360 5500-25B-312

## (undated) DEVICE — ESU NDL COLORADO MICRO 3CM STR N103A

## (undated) DEVICE — PACK MINOR EYE CUSTOM ASC

## (undated) DEVICE — SUCTION TIP YANKAUER STR K87

## (undated) DEVICE — CUP AND LID 2PK 2OZ STERILE  SSK9006A

## (undated) DEVICE — DRSG GAUZE 4X4" 3033

## (undated) DEVICE — BLADE KNIFE SURG 10 371110

## (undated) DEVICE — SU PDS II 4-0 P-3 18" Z494G

## (undated) DEVICE — SU NDL MAYO INTESTINAL 1823-6D

## (undated) DEVICE — LINEN TOWEL PACK X5 5464

## (undated) DEVICE — SUCTION MANIFOLD NEPTUNE 2 SYS 1 PORT 702-025-000

## (undated) DEVICE — GLOVE BIOGEL PI MICRO SZ 7.5 48575

## (undated) DEVICE — DRSG XEROFORM 5X9" 8884431605

## (undated) DEVICE — SU PROLENE 4-0 RB-1DA 36" 8557H

## (undated) DEVICE — SU PDS II 4-0 FS-2 27" Z422H

## (undated) DEVICE — DRSG XEROFORM 1X8"

## (undated) DEVICE — SU MONOCRYL 5-0 P-3 18" UND Y493G

## (undated) DEVICE — CASSETTE SONOPET IRRIG SUCTION STRL 5500-573-000

## (undated) DEVICE — ESU GROUND PAD ADULT W/CORD E7507

## (undated) DEVICE — SOL WATER IRRIG 500ML BOTTLE 2F7113

## (undated) DEVICE — SU PDS II 4-0 RB-1 27" Z304H

## (undated) DEVICE — LINER SUCTION US ASPIRATOR SONOPET CANISTER LF 5450-850-002

## (undated) DEVICE — SU PLAIN 6-0 G-1DA 18" 770G

## (undated) DEVICE — ESU PENCIL W/SMOKE EVAC NEPTUNE STRYKER 0703-046-000

## (undated) DEVICE — SPONGE SURGIFOAM 100 1974

## (undated) DEVICE — SU MONOCRYL 3-0 PS-2 18" UND Y497G

## (undated) DEVICE — SU PDS II 3-0 PS-2 18" Z497G

## (undated) DEVICE — NDL 25GA 2"  8881200441

## (undated) DEVICE — SU VICRYL 7-0 P-1 18" J488G

## (undated) DEVICE — ESU HIGH TEMP LOOP TIP AA03

## (undated) DEVICE — SU MONOCRYL 4-0 P-3 18" UND Y494G

## (undated) DEVICE — SOL NACL 0.9% IRRIG 500ML BOTTLE 2F7123

## (undated) DEVICE — SU MONOCRYL 4-0 PS-2 18" UND Y496G

## (undated) DEVICE — NEEDLE SPINAL DISP 18GA X 3.5" QUINCKE 333350

## (undated) DEVICE — TUBING SUCTION MEDI-VAC SOFT 3/16"X20' N520A

## (undated) DEVICE — ESU GROUND PAD UNIVERSAL W/O CORD

## (undated) DEVICE — TUBING SUCTION MEDI-VAC 1/4"X20' N620A

## (undated) DEVICE — DRAPE EXTREMITY W/ARMBOARD 29405

## (undated) DEVICE — EYE DRSG PAD OVAL

## (undated) DEVICE — SOL NACL 0.9% IRRIG 1000ML BOTTLE 2F7124

## (undated) RX ORDER — CEFAZOLIN SODIUM/WATER 2 G/20 ML
SYRINGE (ML) INTRAVENOUS
Status: DISPENSED
Start: 2025-04-04

## (undated) RX ORDER — TRANEXAMIC ACID 100 MG/ML
INJECTION, SOLUTION INTRAVENOUS
Status: DISPENSED
Start: 2024-09-26

## (undated) RX ORDER — COCAINE HYDROCHLORIDE 40 MG/ML
SOLUTION NASAL
Status: DISPENSED
Start: 2024-09-26

## (undated) RX ORDER — OXYCODONE HYDROCHLORIDE 5 MG/1
TABLET ORAL
Status: DISPENSED
Start: 2024-09-26

## (undated) RX ORDER — ACETAMINOPHEN 325 MG/1
TABLET ORAL
Status: DISPENSED
Start: 2024-09-26

## (undated) RX ORDER — FENTANYL CITRATE 50 UG/ML
INJECTION, SOLUTION INTRAMUSCULAR; INTRAVENOUS
Status: DISPENSED
Start: 2024-09-26

## (undated) RX ORDER — DEXAMETHASONE SODIUM PHOSPHATE 4 MG/ML
INJECTION, SOLUTION INTRA-ARTICULAR; INTRALESIONAL; INTRAMUSCULAR; INTRAVENOUS; SOFT TISSUE
Status: DISPENSED
Start: 2025-04-04

## (undated) RX ORDER — EPHEDRINE SULFATE 50 MG/ML
INJECTION, SOLUTION INTRAMUSCULAR; INTRAVENOUS; SUBCUTANEOUS
Status: DISPENSED
Start: 2025-04-04

## (undated) RX ORDER — ONDANSETRON 2 MG/ML
INJECTION INTRAMUSCULAR; INTRAVENOUS
Status: DISPENSED
Start: 2024-09-26

## (undated) RX ORDER — BUPIVACAINE HYDROCHLORIDE 5 MG/ML
INJECTION, SOLUTION EPIDURAL; INTRACAUDAL
Status: DISPENSED
Start: 2024-09-26

## (undated) RX ORDER — PROPARACAINE HYDROCHLORIDE 5 MG/ML
SOLUTION/ DROPS OPHTHALMIC
Status: DISPENSED
Start: 2024-09-26

## (undated) RX ORDER — DEXAMETHASONE SODIUM PHOSPHATE 4 MG/ML
INJECTION, SOLUTION INTRA-ARTICULAR; INTRALESIONAL; INTRAMUSCULAR; INTRAVENOUS; SOFT TISSUE
Status: DISPENSED
Start: 2024-09-26

## (undated) RX ORDER — PROPOFOL 10 MG/ML
INJECTION, EMULSION INTRAVENOUS
Status: DISPENSED
Start: 2024-09-26

## (undated) RX ORDER — OXYMETAZOLINE HYDROCHLORIDE 0.05 G/100ML
SPRAY NASAL
Status: DISPENSED
Start: 2024-09-26

## (undated) RX ORDER — ERYTHROMYCIN 5 MG/G
OINTMENT OPHTHALMIC
Status: DISPENSED
Start: 2024-09-26

## (undated) RX ORDER — ONDANSETRON 2 MG/ML
INJECTION INTRAMUSCULAR; INTRAVENOUS
Status: DISPENSED
Start: 2025-04-04

## (undated) RX ORDER — FENTANYL CITRATE 50 UG/ML
INJECTION, SOLUTION INTRAMUSCULAR; INTRAVENOUS
Status: DISPENSED
Start: 2025-04-04

## (undated) RX ORDER — CEFAZOLIN SODIUM 2 G/50ML
SOLUTION INTRAVENOUS
Status: DISPENSED
Start: 2024-09-26